# Patient Record
Sex: FEMALE | Race: WHITE | Employment: FULL TIME | URBAN - NONMETROPOLITAN AREA
[De-identification: names, ages, dates, MRNs, and addresses within clinical notes are randomized per-mention and may not be internally consistent; named-entity substitution may affect disease eponyms.]

---

## 2019-02-28 ENCOUNTER — HOSPITAL ENCOUNTER (INPATIENT)
Age: 24
LOS: 3 days | Discharge: HOME | End: 2019-03-03
Attending: OBSTETRICS & GYNECOLOGY | Admitting: OBSTETRICS & GYNECOLOGY
Payer: COMMERCIAL

## 2019-02-28 PROCEDURE — 86901 BLOOD TYPING SEROLOGIC RH(D): CPT

## 2019-02-28 PROCEDURE — 86870 RBC ANTIBODY IDENTIFICATION: CPT

## 2019-02-28 PROCEDURE — 86900 BLOOD TYPING SEROLOGIC ABO: CPT

## 2019-02-28 PROCEDURE — 1220000001 HC SEMI PRIVATE L&D R&B

## 2019-02-28 PROCEDURE — 2709999900 HC NON-CHARGEABLE SUPPLY

## 2019-02-28 PROCEDURE — 86850 RBC ANTIBODY SCREEN: CPT

## 2019-02-28 PROCEDURE — 80307 DRUG TEST PRSMV CHEM ANLYZR: CPT

## 2019-02-28 PROCEDURE — 36415 COLL VENOUS BLD VENIPUNCTURE: CPT

## 2019-02-28 PROCEDURE — 85025 COMPLETE CBC W/AUTO DIFF WBC: CPT

## 2019-02-28 RX ORDER — BUTORPHANOL TARTRATE 1 MG/ML
1 INJECTION, SOLUTION INTRAMUSCULAR; INTRAVENOUS
Status: DISCONTINUED | OUTPATIENT
Start: 2019-02-28 | End: 2019-03-01 | Stop reason: HOSPADM

## 2019-02-28 RX ORDER — ONDANSETRON 2 MG/ML
8 INJECTION INTRAMUSCULAR; INTRAVENOUS EVERY 6 HOURS PRN
Status: DISCONTINUED | OUTPATIENT
Start: 2019-02-28 | End: 2019-03-01 | Stop reason: HOSPADM

## 2019-02-28 RX ORDER — METHYLERGONOVINE MALEATE 0.2 MG/ML
200 INJECTION INTRAVENOUS PRN
Status: DISCONTINUED | OUTPATIENT
Start: 2019-02-28 | End: 2019-03-01 | Stop reason: HOSPADM

## 2019-02-28 RX ORDER — SODIUM CHLORIDE, SODIUM LACTATE, POTASSIUM CHLORIDE, CALCIUM CHLORIDE 600; 310; 30; 20 MG/100ML; MG/100ML; MG/100ML; MG/100ML
INJECTION, SOLUTION INTRAVENOUS CONTINUOUS
Status: DISCONTINUED | OUTPATIENT
Start: 2019-02-28 | End: 2019-03-01

## 2019-02-28 RX ORDER — MORPHINE SULFATE 2 MG/ML
2 INJECTION, SOLUTION INTRAMUSCULAR; INTRAVENOUS
Status: DISCONTINUED | OUTPATIENT
Start: 2019-02-28 | End: 2019-03-01 | Stop reason: HOSPADM

## 2019-02-28 RX ORDER — SODIUM CHLORIDE 0.9 % (FLUSH) 0.9 %
10 SYRINGE (ML) INJECTION EVERY 12 HOURS SCHEDULED
Status: DISCONTINUED | OUTPATIENT
Start: 2019-02-28 | End: 2019-03-01 | Stop reason: HOSPADM

## 2019-02-28 RX ORDER — DIPHENHYDRAMINE HYDROCHLORIDE 50 MG/ML
25 INJECTION INTRAMUSCULAR; INTRAVENOUS EVERY 4 HOURS PRN
Status: DISCONTINUED | OUTPATIENT
Start: 2019-02-28 | End: 2019-03-01 | Stop reason: HOSPADM

## 2019-02-28 RX ORDER — SEVOFLURANE 250 ML/250ML
1 LIQUID RESPIRATORY (INHALATION) CONTINUOUS PRN
Status: DISCONTINUED | OUTPATIENT
Start: 2019-02-28 | End: 2019-03-01 | Stop reason: HOSPADM

## 2019-02-28 RX ORDER — LIDOCAINE HYDROCHLORIDE 10 MG/ML
30 INJECTION, SOLUTION EPIDURAL; INFILTRATION; INTRACAUDAL; PERINEURAL PRN
Status: DISCONTINUED | OUTPATIENT
Start: 2019-02-28 | End: 2019-03-01 | Stop reason: HOSPADM

## 2019-02-28 RX ORDER — MISOPROSTOL 200 UG/1
1000 TABLET ORAL PRN
Status: DISCONTINUED | OUTPATIENT
Start: 2019-02-28 | End: 2019-03-01 | Stop reason: HOSPADM

## 2019-02-28 RX ORDER — IBUPROFEN 800 MG/1
800 TABLET ORAL EVERY 8 HOURS PRN
Status: DISCONTINUED | OUTPATIENT
Start: 2019-02-28 | End: 2019-03-01

## 2019-02-28 RX ORDER — ACETAMINOPHEN 325 MG/1
650 TABLET ORAL EVERY 4 HOURS PRN
Status: DISCONTINUED | OUTPATIENT
Start: 2019-02-28 | End: 2019-03-01 | Stop reason: HOSPADM

## 2019-02-28 RX ORDER — SODIUM CHLORIDE 0.9 % (FLUSH) 0.9 %
10 SYRINGE (ML) INJECTION PRN
Status: DISCONTINUED | OUTPATIENT
Start: 2019-02-28 | End: 2019-03-01 | Stop reason: HOSPADM

## 2019-02-28 RX ORDER — MORPHINE SULFATE 4 MG/ML
4 INJECTION, SOLUTION INTRAMUSCULAR; INTRAVENOUS
Status: DISCONTINUED | OUTPATIENT
Start: 2019-02-28 | End: 2019-03-01 | Stop reason: HOSPADM

## 2019-02-28 RX ORDER — TERBUTALINE SULFATE 1 MG/ML
0.25 INJECTION, SOLUTION SUBCUTANEOUS ONCE
Status: DISCONTINUED | OUTPATIENT
Start: 2019-02-28 | End: 2019-03-01 | Stop reason: HOSPADM

## 2019-03-01 ENCOUNTER — ANESTHESIA EVENT (OUTPATIENT)
Dept: LABOR AND DELIVERY | Age: 24
End: 2019-03-01
Payer: COMMERCIAL

## 2019-03-01 ENCOUNTER — ANESTHESIA (OUTPATIENT)
Dept: LABOR AND DELIVERY | Age: 24
End: 2019-03-01
Payer: COMMERCIAL

## 2019-03-01 PROBLEM — O02.1 FETAL DEMISE BEFORE 20 WEEKS WITH RETENTION OF DEAD FETUS: Status: RESOLVED | Noted: 2019-03-01 | Resolved: 2019-03-01

## 2019-03-01 PROBLEM — O02.1 FETAL DEMISE BEFORE 20 WEEKS WITH RETENTION OF DEAD FETUS: Status: ACTIVE | Noted: 2019-03-01

## 2019-03-01 LAB
AMPHETAMINE+METHAMPHETAMINE URINE SCREEN: NEGATIVE
BARBITURATE QUANTITATIVE URINE: NEGATIVE
BASOPHILS # BLD: 0.3 %
BASOPHILS ABSOLUTE: 0 THOU/MM3 (ref 0–0.1)
BENZODIAZEPINE QUANTITATIVE URINE: NEGATIVE
CANNABINOID QUANTITATIVE URINE: NEGATIVE
COCAINE METABOLITE QUANTITATIVE URINE: NEGATIVE
EOSINOPHIL # BLD: 0.3 %
EOSINOPHILS ABSOLUTE: 0 THOU/MM3 (ref 0–0.4)
ERYTHROCYTE [DISTWIDTH] IN BLOOD BY AUTOMATED COUNT: 14.7 % (ref 11.5–14.5)
ERYTHROCYTE [DISTWIDTH] IN BLOOD BY AUTOMATED COUNT: 49.7 FL (ref 35–45)
HCT VFR BLD CALC: 43.6 % (ref 37–47)
HEMOGLOBIN: 14.4 GM/DL (ref 12–16)
IMMATURE GRANS (ABS): 0.08 THOU/MM3 (ref 0–0.07)
IMMATURE GRANULOCYTES: 0.7 %
LYMPHOCYTES # BLD: 9.7 %
LYMPHOCYTES ABSOLUTE: 1 THOU/MM3 (ref 1–4.8)
MCH RBC QN AUTO: 30.8 PG (ref 26–33)
MCHC RBC AUTO-ENTMCNC: 33 GM/DL (ref 32.2–35.5)
MCV RBC AUTO: 93.4 FL (ref 81–99)
MONOCYTES # BLD: 5 %
MONOCYTES ABSOLUTE: 0.5 THOU/MM3 (ref 0.4–1.3)
NUCLEATED RED BLOOD CELLS: 0 /100 WBC
OPIATES, URINE: NEGATIVE
OXYCODONE: NEGATIVE
PHENCYCLIDINE QUANTITATIVE URINE: NEGATIVE
PLATELET # BLD: 139 THOU/MM3 (ref 130–400)
PMV BLD AUTO: 12.3 FL (ref 9.4–12.4)
RBC # BLD: 4.67 MILL/MM3 (ref 4.2–5.4)
SEG NEUTROPHILS: 84 %
SEGMENTED NEUTROPHILS ABSOLUTE COUNT: 9.1 THOU/MM3 (ref 1.8–7.7)
WBC # BLD: 10.8 THOU/MM3 (ref 4.8–10.8)

## 2019-03-01 PROCEDURE — 0UQMXZZ REPAIR VULVA, EXTERNAL APPROACH: ICD-10-PCS | Performed by: OBSTETRICS & GYNECOLOGY

## 2019-03-01 PROCEDURE — 7200000001 HC VAGINAL DELIVERY

## 2019-03-01 PROCEDURE — 2580000003 HC RX 258: Performed by: OBSTETRICS & GYNECOLOGY

## 2019-03-01 PROCEDURE — 6360000002 HC RX W HCPCS

## 2019-03-01 PROCEDURE — 6370000000 HC RX 637 (ALT 250 FOR IP): Performed by: OBSTETRICS & GYNECOLOGY

## 2019-03-01 PROCEDURE — 3E0R3BZ INTRODUCTION OF ANESTHETIC AGENT INTO SPINAL CANAL, PERCUTANEOUS APPROACH: ICD-10-PCS | Performed by: OBSTETRICS & GYNECOLOGY

## 2019-03-01 PROCEDURE — 88307 TISSUE EXAM BY PATHOLOGIST: CPT

## 2019-03-01 PROCEDURE — 0KQM0ZZ REPAIR PERINEUM MUSCLE, OPEN APPROACH: ICD-10-PCS | Performed by: OBSTETRICS & GYNECOLOGY

## 2019-03-01 PROCEDURE — 3700000025 ANESTHESIA EPIDURAL BLOCK: Performed by: ANESTHESIOLOGY

## 2019-03-01 PROCEDURE — 1220000000 HC SEMI PRIVATE OB R&B

## 2019-03-01 PROCEDURE — 2709999900 HC NON-CHARGEABLE SUPPLY

## 2019-03-01 PROCEDURE — 2500000003 HC RX 250 WO HCPCS: Performed by: ANESTHESIOLOGY

## 2019-03-01 PROCEDURE — 00HU33Z INSERTION OF INFUSION DEVICE INTO SPINAL CANAL, PERCUTANEOUS APPROACH: ICD-10-PCS | Performed by: OBSTETRICS & GYNECOLOGY

## 2019-03-01 PROCEDURE — 6360000002 HC RX W HCPCS: Performed by: OBSTETRICS & GYNECOLOGY

## 2019-03-01 RX ORDER — EPHEDRINE SULFATE 50 MG/ML
5 INJECTION, SOLUTION INTRAVENOUS PRN
Status: DISCONTINUED | OUTPATIENT
Start: 2019-03-01 | End: 2019-03-01

## 2019-03-01 RX ORDER — METHYLERGONOVINE MALEATE 0.2 MG/ML
200 INJECTION INTRAVENOUS
Status: ACTIVE | OUTPATIENT
Start: 2019-03-01 | End: 2019-03-01

## 2019-03-01 RX ORDER — MISOPROSTOL 200 UG/1
800 TABLET ORAL
Status: ACTIVE | OUTPATIENT
Start: 2019-03-01 | End: 2019-03-01

## 2019-03-01 RX ORDER — ONDANSETRON 4 MG/1
8 TABLET, FILM COATED ORAL EVERY 8 HOURS PRN
Status: DISCONTINUED | OUTPATIENT
Start: 2019-03-01 | End: 2019-03-03 | Stop reason: HOSPADM

## 2019-03-01 RX ORDER — HYDROCODONE BITARTRATE AND ACETAMINOPHEN 5; 325 MG/1; MG/1
1 TABLET ORAL EVERY 4 HOURS PRN
Status: DISCONTINUED | OUTPATIENT
Start: 2019-03-01 | End: 2019-03-03 | Stop reason: HOSPADM

## 2019-03-01 RX ORDER — SODIUM CHLORIDE, SODIUM LACTATE, POTASSIUM CHLORIDE, CALCIUM CHLORIDE 600; 310; 30; 20 MG/100ML; MG/100ML; MG/100ML; MG/100ML
INJECTION, SOLUTION INTRAVENOUS CONTINUOUS
Status: DISCONTINUED | OUTPATIENT
Start: 2019-03-01 | End: 2019-03-03 | Stop reason: HOSPADM

## 2019-03-01 RX ORDER — HYDROCODONE BITARTRATE AND ACETAMINOPHEN 5; 325 MG/1; MG/1
2 TABLET ORAL EVERY 4 HOURS PRN
Status: DISCONTINUED | OUTPATIENT
Start: 2019-03-01 | End: 2019-03-03 | Stop reason: HOSPADM

## 2019-03-01 RX ORDER — MORPHINE SULFATE 4 MG/ML
2 INJECTION, SOLUTION INTRAMUSCULAR; INTRAVENOUS
Status: DISCONTINUED | OUTPATIENT
Start: 2019-03-01 | End: 2019-03-03 | Stop reason: HOSPADM

## 2019-03-01 RX ORDER — DOCUSATE SODIUM 100 MG/1
100 CAPSULE, LIQUID FILLED ORAL 2 TIMES DAILY PRN
Status: DISCONTINUED | OUTPATIENT
Start: 2019-03-01 | End: 2019-03-03 | Stop reason: HOSPADM

## 2019-03-01 RX ORDER — FERROUS SULFATE 325(65) MG
325 TABLET ORAL
Status: DISCONTINUED | OUTPATIENT
Start: 2019-03-02 | End: 2019-03-03 | Stop reason: HOSPADM

## 2019-03-01 RX ORDER — LANOLIN 100 %
OINTMENT (GRAM) TOPICAL PRN
Status: DISCONTINUED | OUTPATIENT
Start: 2019-03-01 | End: 2019-03-03 | Stop reason: HOSPADM

## 2019-03-01 RX ORDER — SODIUM CHLORIDE 0.9 % (FLUSH) 0.9 %
10 SYRINGE (ML) INJECTION EVERY 12 HOURS SCHEDULED
Status: DISCONTINUED | OUTPATIENT
Start: 2019-03-01 | End: 2019-03-03 | Stop reason: HOSPADM

## 2019-03-01 RX ORDER — ONDANSETRON 2 MG/ML
4 INJECTION INTRAMUSCULAR; INTRAVENOUS EVERY 6 HOURS PRN
Status: DISCONTINUED | OUTPATIENT
Start: 2019-03-01 | End: 2019-03-01 | Stop reason: HOSPADM

## 2019-03-01 RX ORDER — MORPHINE SULFATE 4 MG/ML
4 INJECTION, SOLUTION INTRAMUSCULAR; INTRAVENOUS
Status: DISCONTINUED | OUTPATIENT
Start: 2019-03-01 | End: 2019-03-03 | Stop reason: HOSPADM

## 2019-03-01 RX ORDER — NALOXONE HYDROCHLORIDE 0.4 MG/ML
0.4 INJECTION, SOLUTION INTRAMUSCULAR; INTRAVENOUS; SUBCUTANEOUS PRN
Status: DISCONTINUED | OUTPATIENT
Start: 2019-03-01 | End: 2019-03-01 | Stop reason: HOSPADM

## 2019-03-01 RX ORDER — NALBUPHINE HCL 10 MG/ML
5 AMPUL (ML) INJECTION EVERY 4 HOURS PRN
Status: DISCONTINUED | OUTPATIENT
Start: 2019-03-01 | End: 2019-03-01 | Stop reason: HOSPADM

## 2019-03-01 RX ORDER — ONDANSETRON 2 MG/ML
4 INJECTION INTRAMUSCULAR; INTRAVENOUS EVERY 6 HOURS PRN
Status: DISCONTINUED | OUTPATIENT
Start: 2019-03-01 | End: 2019-03-03 | Stop reason: HOSPADM

## 2019-03-01 RX ORDER — ACETAMINOPHEN 325 MG/1
650 TABLET ORAL EVERY 4 HOURS PRN
Status: DISCONTINUED | OUTPATIENT
Start: 2019-03-01 | End: 2019-03-03 | Stop reason: HOSPADM

## 2019-03-01 RX ORDER — SODIUM CHLORIDE 0.9 % (FLUSH) 0.9 %
10 SYRINGE (ML) INJECTION PRN
Status: DISCONTINUED | OUTPATIENT
Start: 2019-03-01 | End: 2019-03-03 | Stop reason: HOSPADM

## 2019-03-01 RX ORDER — IBUPROFEN 800 MG/1
800 TABLET ORAL 3 TIMES DAILY
Status: DISCONTINUED | OUTPATIENT
Start: 2019-03-01 | End: 2019-03-03 | Stop reason: HOSPADM

## 2019-03-01 RX ORDER — CARBOPROST TROMETHAMINE 250 UG/ML
250 INJECTION, SOLUTION INTRAMUSCULAR PRN
Status: DISCONTINUED | OUTPATIENT
Start: 2019-03-01 | End: 2019-03-03 | Stop reason: HOSPADM

## 2019-03-01 RX ORDER — CARBOPROST TROMETHAMINE 250 UG/ML
250 INJECTION, SOLUTION INTRAMUSCULAR
Status: DISPENSED | OUTPATIENT
Start: 2019-03-01 | End: 2019-03-01

## 2019-03-01 RX ORDER — LEVOTHYROXINE SODIUM 88 UG/1
88 TABLET ORAL DAILY
Status: DISCONTINUED | OUTPATIENT
Start: 2019-03-01 | End: 2019-03-03 | Stop reason: HOSPADM

## 2019-03-01 RX ADMIN — IBUPROFEN 800 MG: 800 TABLET, FILM COATED ORAL at 13:28

## 2019-03-01 RX ADMIN — SODIUM CHLORIDE, POTASSIUM CHLORIDE, SODIUM LACTATE AND CALCIUM CHLORIDE: 600; 310; 30; 20 INJECTION, SOLUTION INTRAVENOUS at 01:14

## 2019-03-01 RX ADMIN — SODIUM CHLORIDE, POTASSIUM CHLORIDE, SODIUM LACTATE AND CALCIUM CHLORIDE: 600; 310; 30; 20 INJECTION, SOLUTION INTRAVENOUS at 00:25

## 2019-03-01 RX ADMIN — SODIUM CHLORIDE, POTASSIUM CHLORIDE, SODIUM LACTATE AND CALCIUM CHLORIDE: 600; 310; 30; 20 INJECTION, SOLUTION INTRAVENOUS at 05:31

## 2019-03-01 RX ADMIN — LEVOTHYROXINE SODIUM 88 MCG: 88 TABLET ORAL at 15:26

## 2019-03-01 RX ADMIN — BUTORPHANOL TARTRATE 1 MG: 1 INJECTION, SOLUTION INTRAMUSCULAR; INTRAVENOUS at 00:51

## 2019-03-01 RX ADMIN — Medication 16 ML/HR: at 01:42

## 2019-03-01 RX ADMIN — ONDANSETRON 8 MG: 2 INJECTION INTRAMUSCULAR; INTRAVENOUS at 09:33

## 2019-03-01 RX ADMIN — Medication 1 MILLI-UNITS/MIN: at 07:27

## 2019-03-01 ASSESSMENT — PAIN SCALES - GENERAL
PAINLEVEL_OUTOF10: 2
PAINLEVEL_OUTOF10: 10

## 2019-03-01 ASSESSMENT — PAIN DESCRIPTION - DESCRIPTORS
DESCRIPTORS: CRAMPING
DESCRIPTORS: CRAMPING

## 2019-03-01 NOTE — PLAN OF CARE
Problem: Pain:  Goal: Pain level will decrease  Pain level will decrease   Outcome: Ongoing  Pt plans on epidural for pain relief    Problem: Anxiety:  Goal: Level of anxiety will decrease  Level of anxiety will decrease  Outcome: Ongoing  Pt remains calm about the birthing experience,  at bedside, supportive. All questions/concerns addressed by RN. Problem: Breathing Pattern - Ineffective:  Goal: Able to breathe comfortably  Able to breathe comfortably  Outcome: Ongoing  No signs of resp distress noted. Sp02 remains greater than 92% on room air. Respirations equal and unlabored. Problem: Fluid Volume - Imbalance:  Goal: Absence of intrapartum hemorrhage signs and symptoms  Absence of intrapartum hemorrhage signs and symptoms  Outcome: Ongoing  No vaginal bleeding noted, will continue to monitor. Problem: Infection - Intrapartum Infection:  Goal: Will show no infection signs and symptoms  Will show no infection signs and symptoms  Outcome: Ongoing  Vitals stable, pt remains afebrile. FHT's remain reassuring, will continue to monitor. Problem: Labor Process - Prolonged:  Goal: Uterine contractions within specified parameters  Uterine contractions within specified parameters  Outcome: Ongoing  Contractions regular    Problem:  Screening:  Goal: Ability to make informed decisions regarding treatment has improved  Ability to make informed decisions regarding treatment has improved  Outcome: Ongoing  Able to make informed decisions. Oriented x4    Problem: Pain - Acute:  Goal: Pain level will decrease  Pain level will decrease   Outcome: Ongoing  Pt plans on epidural for pain relief    Problem: Tissue Perfusion - Uteroplacental, Altered:  Goal: Absence of abnormal fetal heart rate pattern  Absence of abnormal fetal heart rate pattern  Outcome: Ongoing  . fht's regular and strong with accels noted.  Will continue to monitor    Problem: Urinary Retention:  Goal: Urinary elimination within specified parameters  Urinary elimination within specified parameters  Outcome: Ongoing  Pt voiding sufficiently; will continue to montior    Problem: Falls - Risk of:  Goal: Will remain free from falls  Will remain free from falls  Outcome: Ongoing  Pt to remain from injuries/fall with IV in place, walkway will remain free of clutter. Comments: Care plan reviewed with patient and family. Patient and family verbalize understanding of the plan of care and contribute to goal setting.

## 2019-03-01 NOTE — FLOWSHEET NOTE
Pt instructed to ambulate in halls for an hour per Dr Steve Dela Cruz orders to see if makes any change in ve.  Monitors off

## 2019-03-01 NOTE — FLOWSHEET NOTE
Dr Elvira Coleman phoned unit. Updated on pt's status. Pt comfortable with her epidural. EFM strip remains reactive. Pitocin increased as needed. No new vag exam since her last phone call. Will update as needed.

## 2019-03-01 NOTE — FLOWSHEET NOTE
Dr Eric Velazquez updated that pt pushed well with trial push. Will take out carbajal and begin pushing. Dr Eric Velazquez heading this way.

## 2019-03-01 NOTE — FLOWSHEET NOTE
Pt arrives for c/o ctx that started over a hour ago. Denies any rom,  and states has good fetal movement. Denies any bleeding. Oriented to room and gown given to pt to change into. Patient to bathroom to void, informed of maternal drug testing policy in place on all laboring patients. Consent signed and urine sent.

## 2019-03-01 NOTE — FLOWSHEET NOTE
Dr Eric Velazquez on phone and notified of pt here and status, fht's with accels, ctx pattern, ve.   Orders received

## 2019-03-01 NOTE — PLAN OF CARE
Problem: Pain:  Goal: Pain level will decrease  Pain level will decrease    Outcome: Ongoing  Pt comfortable with her epidural. Decided on a pain goal of 4/10 while she's here. Goal: Control of acute pain  Control of acute pain   Outcome: Ongoing  Pt remains comfortable with her epidural.   Goal: Control of chronic pain  Control of chronic pain   Outcome: Completed Date Met: 19      Problem: Anxiety:  Goal: Level of anxiety will decrease  Level of anxiety will decrease   Outcome: Ongoing  Pt remains calm about the birthing experience, Anthonie and family at bedside, supportive. All questions/concerns addressed by RN. Problem: Breathing Pattern - Ineffective:  Goal: Able to breathe comfortably  Able to breathe comfortably   Outcome: Ongoing  No signs of resp distress noted. Sp02 remains greater than 92% on room air. Respirations equal and unlabored. Problem: Fluid Volume - Imbalance:  Goal: Absence of intrapartum hemorrhage signs and symptoms  Absence of intrapartum hemorrhage signs and symptoms   Outcome: Ongoing  No vaginal bleeding noted, will continue to monitor. Problem: Infection - Intrapartum Infection:  Goal: Will show no infection signs and symptoms  Will show no infection signs and symptoms   Outcome: Ongoing  Vitals stable, pt remains afebrile. GBS negative. FHT's remain reassuring, will continue to monitor. Problem: Labor Process - Prolonged:  Goal: Uterine contractions within specified parameters  Uterine contractions within specified parameters   Outcome: Ongoing  IUPC in place. Pt maggy regularly and pitocin infusing as needed. Will continue to monitor.     Problem:  Screening:  Goal: Ability to make informed decisions regarding treatment has improved  Ability to make informed decisions regarding treatment has improved   Outcome: Completed Date Met: 19      Problem: Pain - Acute:  Goal: Pain level will decrease  Pain level will decrease    Outcome: Ongoing  Pt comfortable with her epidural. Decided on a pain goal of 4/10 while she's here. Problem: Tissue Perfusion - Uteroplacental, Altered:  Goal: Absence of abnormal fetal heart rate pattern  Absence of abnormal fetal heart rate pattern   Outcome: Ongoing  Fetal Heart Tones remain reassuring. Continuous EFM in place. Problem: Urinary Retention:  Goal: Urinary elimination within specified parameters  Urinary elimination within specified parameters   Outcome: Ongoing  Garcia catheter in place, draining clear, yellow urine. Problem: Falls - Risk of:  Goal: Will remain free from falls  Will remain free from falls   Outcome: Ongoing  Pt. Bedrest with her epidural. Remains free from falls at this time. IV infusing per order. Side rails up X2. Call light within reach. S.O. At bedside. RN will continue to provide for a safe environment. Comments: Care plan reviewed with patient and Anthonie. Patient and Anthonie verbalize understanding of the plan of care and contribute to goal setting.

## 2019-03-01 NOTE — FLOWSHEET NOTE
Admitted to 5B 27 in wheelchair from L&D, oriented to room and unit, due to void x2 with assist. Will call for help when up, voices understanding.

## 2019-03-01 NOTE — PLAN OF CARE
Problem: DISCHARGE BARRIERS  Goal: Patient's continuum of care needs are met  Outcome: Ongoing  15 week loss, support offered. See SW note.

## 2019-03-01 NOTE — FLOWSHEET NOTE
Pt taken to [facility] via wheelchair in stable condition. Report given to Camila Blancas RN. Pt d/t void x2.

## 2019-03-01 NOTE — H&P
Stephanie Milton is a 24 y.o. female patient. No diagnosis found. Past Medical History:   Diagnosis Date    Thyroid disease     hypothyroid     OB History      Para Term  AB Living    1              SAB TAB Ectopic Molar Multiple Live Births                       39w2d  Estimated Date of Delivery: 3/6/19  No Known Allergies  Active Problems:    * No active hospital problems. *  Resolved Problems:    * No resolved hospital problems. *    Blood pressure 107/76, pulse 96, temperature 97.9 °F (36.6 °C), temperature source Oral, resp. rate 18, height 5' 6\" (1.676 m), weight 147 lb (66.7 kg), SpO2 91 %, not currently breastfeeding. Maternal Medical History:   Reason for admission: Rupture of membranes and contractions. Contractions: Onset was 6-12 hours ago. Frequency: regular. Perceived severity is mild. Fetal activity: Perceived fetal activity is normal.    Last perceived fetal movement was within the past hour. Prenatal complications: hypothyroid    Prenatal Complications - Diabetes: none. Maternal Exam:   Uterine Assessment: Contraction strength is mild. Contraction frequency is regular. Abdomen: Patient reports no abdominal tenderness. Fetal presentation: vertex    Introitus: Normal vulva. Normal vagina. Amniotic fluid character: clear. Pelvis: adequate for delivery. Cervix: Cervix evaluated by digital exam.    5-6cm per nursing    Fetal Exam  Fetal Monitor Review: Mode: ultrasound. Baseline rate: 120. Variability: moderate (6-25 bpm). Pattern: accelerations present. Fetal State Assessment: Category I - tracings are normal.          Assessment:  Active phase labor. Membrane status: SROM.    Fetal well-being: normal.   39 week gestation  Hypothyroid    Plan:  Admit to L&D  Epidural in place  Pitocin if needed  DO Dave  3/1/2019

## 2019-03-01 NOTE — FLOWSHEET NOTE
Dr Darin Solis updated on pt's status. Pt anterior lip and +1 station. Will try a trial push with pt and if she does well, doc will head this way for delivery.

## 2019-03-01 NOTE — FLOWSHEET NOTE
Darlin care done. Clean pads and gown provided. Dermoplast and tucks pads reapplied but ice pack removed for now.

## 2019-03-01 NOTE — CARE COORDINATION
3/1/19, 2:53 PM    DISCHARGE BARRIERS    Nurse reports 15 week loss after falling down at home, came to the ED and discovered she was pregnant. Nurse states mother has had miscarriage and SIDS at 9 weeks of age. SW met with mother to provide support and intervention. Long conversation with mother, planning [facility] burial, good support at home and plans to follow up with Ernestina Ramos for counseling. SW will contact mother this spring prior to burial. FLORENCIO updated nurse, China Watson.

## 2019-03-01 NOTE — L&D DELIVERY NOTE
Department of Obstetrics and Gynecology  Spontaneous Vaginal Delivery Note      Pt Name: Estrella Thao  MRN: [id number] [place] #: [de-identified]  YOB: 1995  Procedure Performed By: Yaw Lugo D.O.        Pre-operative Diagnosis:  Term pregnancy, Spontaneous labor, Single fetus and Pregnancy complicated by: hypothyroid    Post-operative Diagnosis: Same, delivered, tight nuchal cord. Procedure:  Vacuum assisted vaginal delivery    Surgeon:  Jabier Villalobos D.O. Information for the patient's :  Soni Soares [[id number]]    6lb 2oz      Anesthesia:  epidural anesthesia    Estimated blood loss:  300ml    Specimen:  Placenta sent to pathology     Complications:  maternal temperature, tight nuchal cord, low APGARs    Condition:  infant stable to special care nursery and mother stable    Details of Procedure: The patient is a 24 y.o. female at 44w2d   OB History      Para Term  AB Living    1 1 1     1    SAB TAB Ectopic Molar Multiple Live Births            0 1       who was admitted for active phase labor. She received the following interventions: IV Pitocin augmentation. The patient progressed well,did receive an epidural, became complete and started to push. She was placed in the dorsal lithotomy position and prepped. Fetal heart tones started to progress from early decelerations with pushing to late decelerations. Fetal head was in the +3 position. Patient gave verbal consent to use the vacuum to assist in speeding up the delivery. Vacuum was placed on the fetal vertex and proper placement verified. With the next contraction she delivered the fetal head with vacuum assistance on the second push with only one pull and no pop offs of the vacuum. Once the head was delivered, a tight nuchal cord was noted and clamped and cut at the perineum, the rest of the infant delivered easily, and was placed on the maternal abdomen for recovery.  The nose and mouth were Number of pulls:  1    Vacuum applied by:  DR Garcia Basket    Failed?:  No      Shoulder Dystocia    Shoulder dystocia present?:  No  Add Second Maneuver  Add Third Maneuver  Add Fourth Maneuver  Add Fifth Maneuver  Add Sixth Maneuver  Add Seventh Maneuver  Add Eighth Maneuver  Add Ninth Maneuver      Presentation    Presentation:  Vertex  _:  Occiput  _:  Anterior     [place] Information     Changing the 's delivery date/time could affect patient care.:     Delivery date/time:   3/1/19 1300   Delivery type:  Vaginal, Vacuum (Extractor)    Details:         Delivery Providers    Delivering clinician:  Mya Umana,    Provider Role    Darinel Barrera RN Delivery Nurse    Amarilis Daigle, RN Registered Nurse    Ghulam Verma RCP Respiratory Therapist (Day)    Phuc Guevara, APRN - CNP Advanced Practice Nurse    Nydia Dye MD Physician      Placenta    Date/time:  3/1/2019 1303  Removal:  Spontaneous  Appearance:  Intact  Disposition:  Pathology, Lab     Apgars    Living status:  Living  Apgars   1 Minute:   5 Minute:   10 Minute 15 Minute 20 Minute   Skin Color: 0  0  1      Heart Rate: 1  2  2      Reflex Irritability: 0  1  2      Muscle Tone: 0  1  1      Respiratory Effort: 0  1  2      Total: 1  5  8              Apgars Assigned By:  DERRICK MARTINEZ & [facility] RN     [place] Measurements    Weight:  2800 g    ID band #:  [id number] Security tag #:  [id number]      Delivery Information    Episiotomy:  None  Perineal lacerations:  2nd Repaired:  Yes   Labial laceration:  right Repaired:  Yes   Vaginal laceration:  No    Cervical laceration:  No    Surgical or additional est. blood loss (mL):  0 (View Only):  Edit in Flowsheets   Combined est. blood loss (mL):  0     Vaginal Delivery Counts    Initial count personnel:  ASHLEIGH CH RNC  Initial count verified by:  16 INSTRUMENTS,1 NEEDLE,10 SPONGES   4x4:   Needles:   Instruments:   Lap Pads:   Sponges:     Initial counts:          Final

## 2019-03-01 NOTE — LACTATION NOTE
Infant was taken to SCN. Lactation went to set up pump for pt. Pt. Stated she would like to use her own Medela breast pump and it would be arriving ant the hospital soon. Discussed with pt. Differences in hospital and home pumps. Pt. Stated she would like to use her own pump. Encouraged pt. To call out for assistance with her pump when it arrived. Will follow up wit pt. PRN.

## 2019-03-02 LAB
ABO CHECK: NORMAL
ABO: NORMAL
ANTIBODY IDENTIFICATION: NORMAL
ANTIBODY SCREEN: NORMAL
BASOPHILS # BLD: 0.1 %
BASOPHILS ABSOLUTE: 0 THOU/MM3 (ref 0–0.1)
EOSINOPHIL # BLD: 1.1 %
EOSINOPHILS ABSOLUTE: 0.1 THOU/MM3 (ref 0–0.4)
ERYTHROCYTE [DISTWIDTH] IN BLOOD BY AUTOMATED COUNT: 14.6 % (ref 11.5–14.5)
ERYTHROCYTE [DISTWIDTH] IN BLOOD BY AUTOMATED COUNT: 49.1 FL (ref 35–45)
FETAL SCREEN: NORMAL
GESTATIONAL AGE(WEEKS): NORMAL
HCT VFR BLD CALC: 28.1 % (ref 37–47)
HEMOGLOBIN: 9.4 GM/DL (ref 12–16)
IMMATURE GRANS (ABS): 0.07 THOU/MM3 (ref 0–0.07)
IMMATURE GRANULOCYTES: 0.7 %
LYMPHOCYTES # BLD: 11.6 %
LYMPHOCYTES ABSOLUTE: 1.1 THOU/MM3 (ref 1–4.8)
MCH RBC QN AUTO: 30.8 PG (ref 26–33)
MCHC RBC AUTO-ENTMCNC: 33.5 GM/DL (ref 32.2–35.5)
MCV RBC AUTO: 92.1 FL (ref 81–99)
MONOCYTES # BLD: 6.5 %
MONOCYTES ABSOLUTE: 0.6 THOU/MM3 (ref 0.4–1.3)
NUCLEATED RED BLOOD CELLS: 0 /100 WBC
PLATELET # BLD: 111 THOU/MM3 (ref 130–400)
PMV BLD AUTO: 12.7 FL (ref 9.4–12.4)
RBC # BLD: 3.05 MILL/MM3 (ref 4.2–5.4)
RH FACTOR: NORMAL
RH FACTOR: NORMAL
SEG NEUTROPHILS: 80 %
SEGMENTED NEUTROPHILS ABSOLUTE COUNT: 7.9 THOU/MM3 (ref 1.8–7.7)
SELECTED GEL ANTIBODY SCREEN: NORMAL
WBC # BLD: 9.9 THOU/MM3 (ref 4.8–10.8)

## 2019-03-02 PROCEDURE — 85461 HEMOGLOBIN FETAL: CPT

## 2019-03-02 PROCEDURE — 2709999900 HC NON-CHARGEABLE SUPPLY

## 2019-03-02 PROCEDURE — 1220000000 HC SEMI PRIVATE OB R&B

## 2019-03-02 PROCEDURE — 86901 BLOOD TYPING SEROLOGIC RH(D): CPT

## 2019-03-02 PROCEDURE — 85025 COMPLETE CBC W/AUTO DIFF WBC: CPT

## 2019-03-02 PROCEDURE — 86885 COOMBS TEST INDIRECT QUAL: CPT

## 2019-03-02 PROCEDURE — 36415 COLL VENOUS BLD VENIPUNCTURE: CPT

## 2019-03-02 PROCEDURE — 86900 BLOOD TYPING SEROLOGIC ABO: CPT

## 2019-03-02 PROCEDURE — 6370000000 HC RX 637 (ALT 250 FOR IP): Performed by: OBSTETRICS & GYNECOLOGY

## 2019-03-02 PROCEDURE — 6360000002 HC RX W HCPCS: Performed by: OBSTETRICS & GYNECOLOGY

## 2019-03-02 RX ADMIN — HUMAN RHO(D) IMMUNE GLOBULIN 300 MCG: 300 INJECTION, SOLUTION INTRAMUSCULAR at 18:36

## 2019-03-02 RX ADMIN — LEVOTHYROXINE SODIUM 88 MCG: 88 TABLET ORAL at 10:22

## 2019-03-02 RX ADMIN — IBUPROFEN 800 MG: 800 TABLET, FILM COATED ORAL at 03:09

## 2019-03-02 NOTE — PLAN OF CARE
Care plan reviewed with patient. Patient verbalizes understanding of the plan of care and contributes to goal setting.

## 2019-03-02 NOTE — LACTATION NOTE
Set up pts. Medela pump. Educated pt. On pumping and cleaning pump. Will follow up with pt. PRN. Pt. Is pumping for infant in SCN. Will continue to follow up with pt. PRN.

## 2019-03-02 NOTE — DISCHARGE SUMMARY
Obstetric Discharge Summary      Pt Name: Marian Bernard  MRN: [id number] [place] #: [de-identified]  YOB: 1995        Admitting Diagnosis  IUP  OB History        1    Para   1    Term   1            AB        Living   1       SAB        TAB        Ectopic        Molar        Multiple   0    Live Births   1                Reasons for Admission on 2019  9:15 PM  No comment available  Vaginal Delivery      Intrapartum Procedures        Multiple birth?: no      VAVD           Postpartum/Operative Complications       [place] Data  Information for the patient's :  Claudell Pod [[id number]]   male  Birth Weight: 6 lb 2.8 oz (2.8 kg)      Discharge Diagnosis   s/p VAVD    Discharge Information  Current Discharge Medication List      CONTINUE these medications which have NOT CHANGED    Details   Prenatal MV-Min-Fe Fum-FA-DHA (PRENATAL 1 PO) Take 1 tablet by mouth      levothyroxine (SYNTHROID) 100 MCG tablet Take 88 mcg by mouth Daily              No discharge procedures on file. Vaginal Delivery  Diet regular  Discharge home 3/3/19  Discharge to:  Home  Disposition Stable  Follow up in 4 weeks  Phani Corbett.

## 2019-03-02 NOTE — PLAN OF CARE
Care plan reviewed with patient. Patient verbalizes understanding of the plan of care and contribute to goal setting.

## 2019-03-02 NOTE — PROGRESS NOTES
Department of Obstetrics and Gynecology  Labor and Delivery  Post Partum Day #1      SUBJECTIVE:  Patient feeling well with no complaints. Breastfeeding without difficulty - infant in SCN. Mild lochia. Patient denies pain. Urination without difficulty. OBJECTIVE:/62   Pulse 81   Temp 98.4 °F (36.9 °C) (Oral)   Resp 18   Ht 5' 6\" (1.676 m)   Wt 147 lb (66.7 kg)   SpO2 99%   Breastfeeding? Unknown   BMI 23.73 kg/m²    ABDOMEN:  Soft, non-tender, fundus firm. Extremities: warm and dry. No edema    DATA:    Lab Results   Component Value Date    HGB 9.4 03/02/2019    HCT 28.1 03/02/2019       ASSESSMENT & PLAN:    PPD #1 s/p VAVD.         Routine PP care       D/C home tomorrow       F/U 4 weeks       Jerilyn Boyle

## 2019-03-03 VITALS
RESPIRATION RATE: 16 BRPM | HEART RATE: 64 BPM | DIASTOLIC BLOOD PRESSURE: 78 MMHG | SYSTOLIC BLOOD PRESSURE: 116 MMHG | BODY MASS INDEX: 23.63 KG/M2 | TEMPERATURE: 98.5 F | HEIGHT: 66 IN | OXYGEN SATURATION: 99 % | WEIGHT: 147 LBS

## 2019-03-03 PROCEDURE — 6370000000 HC RX 637 (ALT 250 FOR IP): Performed by: OBSTETRICS & GYNECOLOGY

## 2019-03-03 RX ADMIN — IBUPROFEN 800 MG: 800 TABLET, FILM COATED ORAL at 00:49

## 2019-03-03 RX ADMIN — IBUPROFEN 800 MG: 800 TABLET, FILM COATED ORAL at 10:43

## 2019-03-03 RX ADMIN — DOCUSATE SODIUM 100 MG: 100 CAPSULE, LIQUID FILLED ORAL at 10:44

## 2019-03-03 RX ADMIN — LEVOTHYROXINE SODIUM 88 MCG: 88 TABLET ORAL at 13:43

## 2019-03-03 ASSESSMENT — PAIN SCALES - GENERAL: PAINLEVEL_OUTOF10: 4

## 2019-03-03 NOTE — PLAN OF CARE
Problem: Fluid Volume - Imbalance:  Goal: Absence of postpartum hemorrhage signs and symptoms  Description  Absence of postpartum hemorrhage signs and symptoms   3/3/2019 0939 by Rosie Weber RN  Outcome: Ongoing  Note:   Small amt vaginal bleeding     Problem: Pain - Acute:  Goal: Pain level will decrease  Description  Pain level will decrease   3/3/2019 0939 by Rosie Weber RN  Outcome: Ongoing  Note:   Pain is minimal     Problem: Discharge Planning:  Goal: Discharged to appropriate level of care  Description  Discharged to appropriate level of care   3/3/2019 0939 by Kae Vásquez RN  Outcome: Ongoing  Note:   Discharge is planned for today     Problem: Constipation:  Goal: Bowel elimination is within specified parameters  Description  Bowel elimination is within specified parameters   3/3/2019 0939 by Rosie Weber RN  Outcome: Ongoing  Note:   Pt is taking stool softeners     Problem: Infection - Risk of, Puerperal Infection:  Goal: Will show no infection signs and symptoms  Description  Will show no infection signs and symptoms   3/3/2019 0939 by Rosie Weber RN  Outcome: Ongoing  Note:   Vital signs WNL, no sign of infection     Problem: Mood - Altered:  Goal: Mood stable  Description  Mood stable   3/3/2019 0939 by Kae Vásquez RN  Outcome: Ongoing  Note:   Pt is cheerful and appropriate

## 2019-03-03 NOTE — FLOWSHEET NOTE
Mother's blood type is A NEG. Baby's blood type is A+. Mother DID receive Rhogam on 3/2/2019  Postpartum education brochure given, teaching complete. [place] postpartum depression screening discussed with patient, instructed to contact her healthcare provider if her score is > 10Postpartum education brochure given, teaching complete. [place] postpartum depression screening discussed with patient. Patient instructed to complete [place] postpartum depression screening in 2 weeks and contact her healthcare provider if her score is > 10. Patient voiced understanding. Reviewed postpartum birth warning signs flyer with patient. Patient has voiced understanding of teaching.

## 2019-03-03 NOTE — PLAN OF CARE
Problem: Fluid Volume - Imbalance:  Goal: Absence of postpartum hemorrhage signs and symptoms  Description  Absence of postpartum hemorrhage signs and symptoms   Outcome: Ongoing  Note:   Small amount of lochia     Problem: Pain - Acute:  Goal: Pain level will decrease  Description  Pain level will decrease   Outcome: Ongoing  Note:   Pain controlled with oral pain medication     Problem: Discharge Planning:  Goal: Discharged to appropriate level of care  Description  Discharged to appropriate level of care   Outcome: Ongoing  Note:   Plan is for discharge home today     Problem: Constipation:  Goal: Bowel elimination is within specified parameters  Description  Bowel elimination is within specified parameters   Outcome: Ongoing  Note:   Passing gas, refused stool softener     Problem: Infection - Risk of, Puerperal Infection:  Goal: Will show no infection signs and symptoms  Description  Will show no infection signs and symptoms   Outcome: Ongoing  Note:   No signs of infection     Problem: Mood - Altered:  Goal: Mood stable  Description  Mood stable   Outcome: Ongoing  Note:   Pleasant and cooperative

## 2020-03-09 NOTE — PLAN OF CARE
Care plan reviewed with patient. Patient verbalizes understanding of the plan of care and contributes to goal setting. adequate pain mgmt, ambulate w/o difficulty, normal bleeding

## 2022-11-10 ENCOUNTER — OFFICE VISIT (OUTPATIENT)
Dept: FAMILY MEDICINE CLINIC | Age: 27
End: 2022-11-10
Payer: COMMERCIAL

## 2022-11-10 VITALS
HEART RATE: 72 BPM | RESPIRATION RATE: 12 BRPM | SYSTOLIC BLOOD PRESSURE: 106 MMHG | BODY MASS INDEX: 19.06 KG/M2 | HEIGHT: 66 IN | WEIGHT: 118.6 LBS | DIASTOLIC BLOOD PRESSURE: 60 MMHG

## 2022-11-10 DIAGNOSIS — E06.3 HYPOTHYROIDISM DUE TO HASHIMOTO'S THYROIDITIS: ICD-10-CM

## 2022-11-10 DIAGNOSIS — E03.8 HYPOTHYROIDISM DUE TO HASHIMOTO'S THYROIDITIS: ICD-10-CM

## 2022-11-10 DIAGNOSIS — Z00.00 WELL ADULT EXAM: Primary | ICD-10-CM

## 2022-11-10 PROCEDURE — 99385 PREV VISIT NEW AGE 18-39: CPT | Performed by: NURSE PRACTITIONER

## 2022-11-10 SDOH — ECONOMIC STABILITY: FOOD INSECURITY: WITHIN THE PAST 12 MONTHS, YOU WORRIED THAT YOUR FOOD WOULD RUN OUT BEFORE YOU GOT MONEY TO BUY MORE.: NEVER TRUE

## 2022-11-10 SDOH — ECONOMIC STABILITY: FOOD INSECURITY: WITHIN THE PAST 12 MONTHS, THE FOOD YOU BOUGHT JUST DIDN'T LAST AND YOU DIDN'T HAVE MONEY TO GET MORE.: NEVER TRUE

## 2022-11-10 ASSESSMENT — PATIENT HEALTH QUESTIONNAIRE - PHQ9
SUM OF ALL RESPONSES TO PHQ9 QUESTIONS 1 & 2: 0
1. LITTLE INTEREST OR PLEASURE IN DOING THINGS: 0
SUM OF ALL RESPONSES TO PHQ QUESTIONS 1-9: 0
2. FEELING DOWN, DEPRESSED OR HOPELESS: 0
SUM OF ALL RESPONSES TO PHQ QUESTIONS 1-9: 0

## 2022-11-10 ASSESSMENT — SOCIAL DETERMINANTS OF HEALTH (SDOH): HOW HARD IS IT FOR YOU TO PAY FOR THE VERY BASICS LIKE FOOD, HOUSING, MEDICAL CARE, AND HEATING?: NOT HARD AT ALL

## 2022-11-10 ASSESSMENT — ENCOUNTER SYMPTOMS
SHORTNESS OF BREATH: 0
COUGH: 0
NAUSEA: 0
ABDOMINAL PAIN: 0

## 2022-11-10 NOTE — PROGRESS NOTES
Subjective:      Patient ID: Vijay June 1995 is a 28 y.o. female here for evaluation. NP to establish care. Former PCP was Dr. Kellie Pond    Past Medical History:   Diagnosis Date    Thyroid disease     hypothyroid       Past Surgical History:   Procedure Laterality Date    FRACTURE SURGERY      left arm with plate and screws    WISDOM TOOTH EXTRACTION         Family History   Problem Relation Age of Onset    Heart Disease Father     Heart Attack Father     Arthritis Neg Hx     Asthma Neg Hx     Atrial Fibrillation Neg Hx     Birth Defects Neg Hx        Current Outpatient Medications   Medication Sig Dispense Refill    levothyroxine (SYNTHROID) 100 MCG tablet Take 100 mcg by mouth Daily       No current facility-administered medications for this visit. Social:     Chief Complaint   Patient presents with    Establish Care     Previous patient of Dr. Kellie Pond    Hypothyroidism       Hashimoto. Fatigue. Puffiness in face and eyes. Has been off medication for 6+ months . Last check 1 year ago. Last dose was 100 mcg but felt like it was too much. Heavy periods. Regular. Vitals:    11/10/22 1105   BP: 106/60   Pulse: 72   Resp: 12        Denies CP, SOB or chest tightness. Active. Regular exercise. BP Readings from Last 3 Encounters:   11/10/22 106/60   03/03/19 116/78   06/10/16 106/65       Due for screening labs.     No results found for: LABA1C  No results found for: EAG    No components found for: CHLPL  No results found for: TRIG  No results found for: HDL  No results found for: LDLCALC  No results found for: LABVLDL      Chemistry        Component Value Date/Time     08/12/2012 2343    K 3.8 08/12/2012 2343     08/12/2012 2343    CO2 27 08/12/2012 2343    BUN 12 08/12/2012 2343    CREATININE 0.7 08/12/2012 2343        Component Value Date/Time    CALCIUM 9.6 08/12/2012 2343            No results found for: TSH, E4RBHZC, G6FPZWL, THYROIDAB    Lab Results   Component Value Date    WBC 9.9 03/02/2019    HGB 9.4 (L) 03/02/2019    HCT 28.1 (L) 03/02/2019    MCV 92.1 03/02/2019     (L) 03/02/2019         Health Maintenance   Topic Date Due    COVID-19 Vaccine (1) Never done    Varicella vaccine (1 of 2 - 2-dose childhood series) Never done    Depression Screen  Never done    HIV screen  Never done    Hepatitis C screen  Never done    DTaP/Tdap/Td vaccine (1 - Tdap) Never done    Pap smear  Never done    Flu vaccine (1) Never done    Hepatitis A vaccine  Aged Out    Hib vaccine  Aged Out    Meningococcal (ACWY) vaccine  Aged Out    Pneumococcal 0-64 years Vaccine  Aged Out       There is no immunization history for the selected administration types on file for this patient. Review of Systems   Constitutional:  Positive for fatigue. Negative for chills and fever. HENT: Negative. Respiratory:  Negative for cough and shortness of breath. Cardiovascular:  Negative for chest pain. Gastrointestinal:  Negative for abdominal pain and nausea. Genitourinary:  Positive for menstrual problem. Skin:  Negative for rash. Neurological:  Negative for dizziness, light-headedness and headaches. Psychiatric/Behavioral: Negative. Objective:   Physical Exam  Constitutional:       General: She is not in acute distress. Eyes:      Pupils: Pupils are equal, round, and reactive to light. Neck:      Thyroid: No thyroid mass, thyromegaly or thyroid tenderness. Cardiovascular:      Rate and Rhythm: Normal rate and regular rhythm. Heart sounds: No murmur heard. Pulmonary:      Effort: Pulmonary effort is normal.      Breath sounds: Normal breath sounds. No wheezing. Abdominal:      General: Bowel sounds are normal. There is no distension. Palpations: Abdomen is soft. Tenderness: There is no abdominal tenderness. Musculoskeletal:         General: No tenderness. Normal range of motion. Cervical back: Normal range of motion and neck supple. Lymphadenopathy:      Cervical: No cervical adenopathy. Skin:     General: Skin is warm and dry. Findings: No rash. Assessment:       Diagnosis Orders   1. Well adult exam  CBC    Lipid Panel    Comprehensive Metabolic Panel    Hemoglobin A1C    TSH    T4, Free      2. Hypothyroidism due to Hashimoto's thyroiditis                Plan:      Pmhx reviewed  Screening Lab  Restart Synthroid 50 mcg  Recheck TSH in 2 months  Healthy Lifestyles discussed  RTO in 2 months    Current Outpatient Medications   Medication Sig Dispense Refill    levothyroxine (SYNTHROID) 100 MCG tablet Take 100 mcg by mouth Daily       No current facility-administered medications for this visit.

## 2022-11-22 LAB
ABSOLUTE BASO #: 0.02 K/UL (ref 0–0.2)
ABSOLUTE EOS #: 0.05 K/UL (ref 0–0.5)
ABSOLUTE LYMPH #: 0.84 K/UL (ref 1–4)
ABSOLUTE MONO #: 0.14 K/UL (ref 0.2–1)
ABSOLUTE NEUT #: 1.65 K/UL (ref 1.5–7.5)
ALBUMIN SERPL-MCNC: 4.9 G/DL (ref 3.5–5.2)
ALK PHOSPHATASE: 41 U/L (ref 40–112)
ALT SERPL-CCNC: 17 U/L (ref 5–40)
ANION GAP SERPL CALCULATED.3IONS-SCNC: 10 MEQ/L (ref 7–16)
AST SERPL-CCNC: 35 U/L (ref 9–40)
AVERAGE GLUCOSE: 100 MG/DL
BASOPHILS RELATIVE PERCENT: 0.7 %
BILIRUB SERPL-MCNC: 0.5 MG/DL
BUN BLDV-MCNC: 16 MG/DL (ref 6–20)
CALCIUM SERPL-MCNC: 9.2 MG/DL (ref 8.5–10.5)
CHLORIDE BLD-SCNC: 105 MEQ/L (ref 95–107)
CHOLESTEROL/HDL RATIO: 2.4 RATIO
CHOLESTEROL: 197 MG/DL
CO2: 28 MEQ/L (ref 19–31)
CREAT SERPL-MCNC: 1.41 MG/DL (ref 0.6–1.3)
EGFR IF NONAFRICAN AMERICAN: 52 ML/MIN/1.73
EOSINOPHILS RELATIVE PERCENT: 1.9 %
GLUCOSE: 84 MG/DL (ref 70–99)
HBA1C MFR BLD: 5.1 % (ref 4.2–5.6)
HCT VFR BLD CALC: 27.3 % (ref 34–45)
HDLC SERPL-MCNC: 81 MG/DL
HEMOGLOBIN: 8.2 G/DL (ref 11.5–15.5)
LDL CHOLESTEROL CALCULATED: 103 MG/DL
LDL/HDL RATIO: 1.3 RATIO
LYMPHOCYTE %: 31.1 %
MCH RBC QN AUTO: 21.9 PG (ref 25–33)
MCHC RBC AUTO-ENTMCNC: 30 G/DL (ref 31–36)
MCV RBC AUTO: 72.8 FL (ref 80–99)
MONOCYTES # BLD: 5.2 %
NEUTROPHILS RELATIVE PERCENT: 61.1 %
PDW BLD-RTO: 17.6 % (ref 11.5–15)
PLATELETS: 170 K/UL (ref 130–400)
PMV BLD AUTO: 11.9 FL (ref 9.3–13)
POTASSIUM SERPL-SCNC: 4.1 MEQ/L (ref 3.5–5.4)
RBC: 3.75 M/UL (ref 3.8–5.4)
SODIUM BLD-SCNC: 143 MEQ/L (ref 133–146)
TOTAL PROTEIN: 7.1 G/DL (ref 6.1–8.3)
TRIGL SERPL-MCNC: 65 MG/DL
VLDLC SERPL CALC-MCNC: 13 MG/DL
WBC: 2.7 K/UL (ref 3.5–11)

## 2022-11-23 DIAGNOSIS — D64.9 HEMOGLOBIN LOW: ICD-10-CM

## 2022-11-23 DIAGNOSIS — D50.0 IRON DEFICIENCY ANEMIA DUE TO CHRONIC BLOOD LOSS: Primary | ICD-10-CM

## 2022-11-23 DIAGNOSIS — N92.0 MENORRHAGIA WITH REGULAR CYCLE: ICD-10-CM

## 2022-11-23 LAB
T4 FREE: 0.31 NG/DL (ref 0.8–1.9)
TSH SERPL DL<=0.05 MIU/L-ACNC: 352 UIU/ML (ref 0.4–4.1)

## 2022-11-27 DIAGNOSIS — E06.3 HYPOTHYROIDISM DUE TO HASHIMOTO'S THYROIDITIS: Primary | ICD-10-CM

## 2022-11-27 DIAGNOSIS — E03.8 HYPOTHYROIDISM DUE TO HASHIMOTO'S THYROIDITIS: Primary | ICD-10-CM

## 2022-11-27 RX ORDER — LEVOTHYROXINE SODIUM 0.1 MG/1
100 TABLET ORAL DAILY
Qty: 90 TABLET | Refills: 0 | Status: SHIPPED | OUTPATIENT
Start: 2022-11-27

## 2022-12-02 ENCOUNTER — HOSPITAL ENCOUNTER (OUTPATIENT)
Dept: INFUSION THERAPY | Age: 27
Discharge: HOME | End: 2022-12-02
Payer: COMMERCIAL

## 2022-12-02 ENCOUNTER — OFFICE VISIT (OUTPATIENT)
Dept: ONCOLOGY | Age: 27
End: 2022-12-02
Payer: COMMERCIAL

## 2022-12-02 VITALS
WEIGHT: 120 LBS | OXYGEN SATURATION: 100 % | RESPIRATION RATE: 16 BRPM | TEMPERATURE: 98.2 F | DIASTOLIC BLOOD PRESSURE: 72 MMHG | SYSTOLIC BLOOD PRESSURE: 160 MMHG | BODY MASS INDEX: 19.29 KG/M2 | HEART RATE: 72 BPM | HEIGHT: 66 IN

## 2022-12-02 VITALS
OXYGEN SATURATION: 100 % | TEMPERATURE: 98.2 F | SYSTOLIC BLOOD PRESSURE: 160 MMHG | HEART RATE: 72 BPM | RESPIRATION RATE: 16 BRPM | DIASTOLIC BLOOD PRESSURE: 72 MMHG

## 2022-12-02 DIAGNOSIS — D50.0 IRON DEFICIENCY ANEMIA DUE TO CHRONIC BLOOD LOSS: ICD-10-CM

## 2022-12-02 DIAGNOSIS — D50.9 MICROCYTIC ANEMIA: Primary | ICD-10-CM

## 2022-12-02 DIAGNOSIS — D50.9 MICROCYTIC ANEMIA: ICD-10-CM

## 2022-12-02 LAB
ABSOLUTE IMMATURE GRANULOCYTE: 0 THOU/MM3 (ref 0–0.07)
BASINOPHIL, AUTOMATED: 1 % (ref 0–3)
BASOPHILS ABSOLUTE: 0 THOU/MM3 (ref 0–0.1)
EOSINOPHILS ABSOLUTE: 0 THOU/MM3 (ref 0–0.4)
EOSINOPHILS RELATIVE PERCENT: 1 % (ref 0–4)
FERRITIN: 4 NG/ML (ref 10–291)
FOLATE: 14.6 NG/ML (ref 4.8–24.2)
HCT VFR BLD CALC: 26.3 % (ref 37–47)
HEMOGLOBIN: 7.7 GM/DL (ref 12–16)
IMMATURE GRANULOCYTES: 0 %
IRON: 17 UG/DL (ref 50–170)
LYMPHOCYTES # BLD: 23 % (ref 15–47)
LYMPHOCYTES ABSOLUTE: 0.6 THOU/MM3 (ref 1–4.8)
MCH RBC QN AUTO: 22 PG (ref 26–33)
MCHC RBC AUTO-ENTMCNC: 29.3 GM/DL (ref 32.2–35.5)
MCV RBC AUTO: 75 FL (ref 81–99)
MONOCYTES ABSOLUTE: 0.2 THOU/MM3 (ref 0.4–1.3)
MONOCYTES: 8 % (ref 0–12)
PDW BLD-RTO: 18.6 % (ref 11.5–14.5)
PLATELET # BLD: 202 THOU/MM3 (ref 130–400)
PMV BLD AUTO: 10.5 FL (ref 9.4–12.4)
RBC # BLD: 3.5 MILL/MM3 (ref 4.2–5.4)
SEG NEUTROPHILS: 67 % (ref 43–75)
SEGMENTED NEUTROPHILS ABSOLUTE COUNT: 1.8 THOU/MM3 (ref 1.8–7.7)
TOTAL IRON BINDING CAPACITY: 394 UG/DL (ref 171–450)
VITAMIN B-12: 481 PG/ML (ref 211–911)
WBC # BLD: 2.6 THOU/MM3 (ref 4.8–10.8)

## 2022-12-02 PROCEDURE — 82607 VITAMIN B-12: CPT

## 2022-12-02 PROCEDURE — 99211 OFF/OP EST MAY X REQ PHY/QHP: CPT

## 2022-12-02 PROCEDURE — 83540 ASSAY OF IRON: CPT

## 2022-12-02 PROCEDURE — 99204 OFFICE O/P NEW MOD 45 MIN: CPT | Performed by: PHYSICIAN ASSISTANT

## 2022-12-02 PROCEDURE — 36415 COLL VENOUS BLD VENIPUNCTURE: CPT

## 2022-12-02 PROCEDURE — 85025 COMPLETE CBC W/AUTO DIFF WBC: CPT

## 2022-12-02 PROCEDURE — 82746 ASSAY OF FOLIC ACID SERUM: CPT

## 2022-12-02 PROCEDURE — 83550 IRON BINDING TEST: CPT

## 2022-12-02 PROCEDURE — 82728 ASSAY OF FERRITIN: CPT

## 2022-12-02 RX ORDER — DIPHENHYDRAMINE HYDROCHLORIDE 50 MG/ML
50 INJECTION INTRAMUSCULAR; INTRAVENOUS
OUTPATIENT
Start: 2022-12-02

## 2022-12-02 RX ORDER — SODIUM CHLORIDE 9 MG/ML
5-250 INJECTION, SOLUTION INTRAVENOUS PRN
OUTPATIENT
Start: 2022-12-02

## 2022-12-02 RX ORDER — ONDANSETRON 2 MG/ML
8 INJECTION INTRAMUSCULAR; INTRAVENOUS
OUTPATIENT
Start: 2022-12-02

## 2022-12-02 RX ORDER — ALBUTEROL SULFATE 90 UG/1
4 AEROSOL, METERED RESPIRATORY (INHALATION) PRN
OUTPATIENT
Start: 2022-12-02

## 2022-12-02 RX ORDER — ACETAMINOPHEN 325 MG/1
650 TABLET ORAL
OUTPATIENT
Start: 2022-12-02

## 2022-12-02 RX ORDER — EPINEPHRINE 1 MG/ML
0.3 INJECTION, SOLUTION, CONCENTRATE INTRAVENOUS PRN
OUTPATIENT
Start: 2022-12-02

## 2022-12-02 RX ORDER — SODIUM CHLORIDE 9 MG/ML
INJECTION, SOLUTION INTRAVENOUS CONTINUOUS
OUTPATIENT
Start: 2022-12-02

## 2022-12-02 RX ORDER — FAMOTIDINE 10 MG/ML
20 INJECTION, SOLUTION INTRAVENOUS
OUTPATIENT
Start: 2022-12-02

## 2022-12-02 RX ORDER — SODIUM CHLORIDE 0.9 % (FLUSH) 0.9 %
5-40 SYRINGE (ML) INJECTION PRN
OUTPATIENT
Start: 2022-12-02

## 2022-12-02 RX ORDER — HEPARIN SODIUM (PORCINE) LOCK FLUSH IV SOLN 100 UNIT/ML 100 UNIT/ML
500 SOLUTION INTRAVENOUS PRN
OUTPATIENT
Start: 2022-12-02

## 2022-12-02 NOTE — PROGRESS NOTES
[facility] [address]  [address] [address]  Dept: [phone]  Dept Fax: [phone]: [phone]      Visit Date:12/2/2022     Karen Tam is a 28 y.o. female who presents today for:   Chief Complaint   Patient presents with    New Patient      Iron deficiency anemia due to chronic blood loss        HPI:   Karen Tam is a 28 y.o. female referred to Hematology/Oncology clinic for evaluation of iron deficiency anemia due to chronic blood loss per her PCP, SEAN Delong. The patient was evaluated by her PCP to establish care on 11/10/2022. She had routine lab work completed including CBC which showed Hgb 8.2, Hct 27.3, MCV 72.8. she was referred for further evaluation. CBC also showed WBC count 2.7 and platelet count 405,849. .00 and free T4 0.31. She was restarted on synthroid. She denies epistaxis, hemoptysis, hematemesis, melena, hematochezia, hematuria. She affirms heavy menstrual cycles which last approximately 7 days. She affirms passing clots throughout her cycle with heaviest days 1 through 3. Her LMP was 11/20/22. She affirms upcoming appointment with gynecology in January 2023. She denies history of malabsorptive disorder such as IBD or celiac disease. She denies prior gastrointestinal surgeries. She affirms adequate intake of oral iron in her diet. The patient affirms fatigue which is chronic. She affirms intermittent lightheadedness, near syncope. She affirms pica symptoms with craving ice. Denies dyspnea on exertion or palpitations. PMH includes hypothyroidism. Denies alcohol or tobacco use.        Past Medical History:   Diagnosis Date    Thyroid disease     hypothyroid      Past Surgical History:   Procedure Laterality Date    FRACTURE SURGERY      left arm with plate and screws    WISDOM TOOTH EXTRACTION        Family History   Problem Relation Age of Onset    Heart Disease Father     Heart Attack Father Arthritis Neg Hx     Asthma Neg Hx     Atrial Fibrillation Neg Hx     Birth Defects Neg Hx       Social History     Tobacco Use    Smoking status: Never    Smokeless tobacco: Never   Substance Use Topics    Alcohol use: Yes     Comment: soc      Current Outpatient Medications   Medication Sig Dispense Refill    levothyroxine (SYNTHROID) 100 MCG tablet Take 1 tablet by mouth daily 90 tablet 0     No current facility-administered medications for this visit. No Known Allergies       Review of Systems:   Review of Systems   Pertinent review of systems noted in HPI, all other ROS negative. Objective:   Physical Exam   BP (!) 160/72 (Site: Left Upper Arm, Position: Sitting, Cuff Size: Medium Adult)   Pulse 72   Temp 98.2 °F (36.8 °C) (Oral)   Resp 16   Ht 5' 6\" (1.676 m)   Wt 120 lb (54.4 kg)   SpO2 100%   BMI 19.37 kg/m²    General appearance: No apparent distress, well developed, pale and cooperative. HEENT: Pupils equal, round, and reactive to light. Conjunctivae/corneas clear. Oral mucosa moist.   Neck: Supple, with full range of motion. Trachea midline. Respiratory:  Normal respiratory effort. Clear to auscultation, bilaterally without Rales/Wheezes/Rhonchi. Cardiovascular: Regular rate and rhythm with normal S1/S2  Abdomen: Soft, active bowel sounds. Musculoskeletal: No clubbing, cyanosis or edema bilaterally. Ambulates in office. Skin: Skin color - pale, texture, turgor normal.  No visible rashes or lesions. Neurologic:  Neurovascularly intact without any focal sensory/motor deficits.    Psychiatric: Alert and oriented      Imaging Studies and Labs:   CBC:   Lab Results   Component Value Date    WBC 2.7 (L) 11/21/2022    HGB 8.2 (L) 11/21/2022    HCT 27.3 (L) 11/21/2022    MCV 72.8 (L) 11/21/2022     11/21/2022     BMP:   Lab Results   Component Value Date/Time     11/21/2022 09:36 AM    K 4.1 11/21/2022 09:36 AM     11/21/2022 09:36 AM    CO2 28 11/21/2022 09:36 AM    BUN 16 11/21/2022 09:36 AM    CREATININE 1.41 11/21/2022 09:36 AM    GLUCOSE 84 11/21/2022 09:36 AM    CALCIUM 9.2 11/21/2022 09:36 AM      LFT:   Lab Results   Component Value Date    ALT 17 11/21/2022    AST 35 11/21/2022    ALKPHOS 41 11/21/2022    BILITOT 0.5 11/21/2022         Assessment and Plan:   1. Microcytic anemia  CBC on 11/21/22 showed Hgb 8.2, Hct 27.3, MCV 72.8. WBC count 2.7 and platelet count 669,142. Microcytosis likely related to iron deficiency anemia from chronic blood loss. Patient has heavy menses. Also uncontrolled hypothyroidism, recently restarted on Synthroid. Prior CBC on 3/2/2019 patient was 1 day postpartum with Hgb 9.4, hct 28.1.    -Will obtain ferritin, iron, TIBC, vitamin B12 and folate today   -Will schedule for IV Venofer, total of 3 infusions to be given at least one week apart    -Will continue to monitor Hgb/Hct and iron levels   -Patient instructed to monitor for signs/symptoms of anemia or blood loss   -Return to clinic in 8 weeks   -CBC, ferritin, iron, TIBC on RTC     2. Iron deficiency anemia due to chronic blood loss  3. Leukopenia  Possibly related to uncontrolled hypothyroidism. Will follow. Return in about 8 weeks (around 1/27/2023). All patient questions answered. Pt voiced understanding. Patient agreed with treatment plan. Follow up as directed. Patient instructed to call for questions or concerns.      Electronically signed by   Axel Busch PA-C

## 2022-12-02 NOTE — PATIENT INSTRUCTIONS
Proceed with IV Venofer, total of 3 infusions to be given at least one week apart. Return to clinic in 8 weeks. Obtain ferritin, iron, TIBC, folate, B12 and CBC today  Labs on RTC: CBC, ferritin, iron, TIBC  Please call for questions or concerns.

## 2022-12-30 ENCOUNTER — HOSPITAL ENCOUNTER (OUTPATIENT)
Dept: INFUSION THERAPY | Age: 27
Discharge: HOME | End: 2022-12-30
Payer: COMMERCIAL

## 2022-12-30 VITALS
OXYGEN SATURATION: 97 % | SYSTOLIC BLOOD PRESSURE: 104 MMHG | TEMPERATURE: 98.1 F | HEART RATE: 69 BPM | DIASTOLIC BLOOD PRESSURE: 61 MMHG | RESPIRATION RATE: 18 BRPM

## 2022-12-30 DIAGNOSIS — D50.9 MICROCYTIC ANEMIA: Primary | ICD-10-CM

## 2022-12-30 DIAGNOSIS — D50.0 IRON DEFICIENCY ANEMIA DUE TO CHRONIC BLOOD LOSS: ICD-10-CM

## 2022-12-30 PROCEDURE — 96366 THER/PROPH/DIAG IV INF ADDON: CPT

## 2022-12-30 PROCEDURE — 96365 THER/PROPH/DIAG IV INF INIT: CPT

## 2022-12-30 PROCEDURE — 6360000002 HC RX W HCPCS: Performed by: PHYSICIAN ASSISTANT

## 2022-12-30 PROCEDURE — 2580000003 HC RX 258: Performed by: PHYSICIAN ASSISTANT

## 2022-12-30 RX ORDER — ONDANSETRON 2 MG/ML
8 INJECTION INTRAMUSCULAR; INTRAVENOUS
Status: CANCELLED | OUTPATIENT
Start: 2023-01-06

## 2022-12-30 RX ORDER — HEPARIN SODIUM (PORCINE) LOCK FLUSH IV SOLN 100 UNIT/ML 100 UNIT/ML
500 SOLUTION INTRAVENOUS PRN
Status: CANCELLED | OUTPATIENT
Start: 2023-01-06

## 2022-12-30 RX ORDER — SODIUM CHLORIDE 9 MG/ML
INJECTION, SOLUTION INTRAVENOUS CONTINUOUS
Status: CANCELLED | OUTPATIENT
Start: 2023-01-06

## 2022-12-30 RX ORDER — SODIUM CHLORIDE 9 MG/ML
INJECTION, SOLUTION INTRAVENOUS CONTINUOUS
Status: DISCONTINUED | OUTPATIENT
Start: 2022-12-30 | End: 2022-12-31 | Stop reason: HOSPADM

## 2022-12-30 RX ORDER — ACETAMINOPHEN 325 MG/1
650 TABLET ORAL
Status: CANCELLED | OUTPATIENT
Start: 2023-01-06

## 2022-12-30 RX ORDER — SODIUM CHLORIDE 9 MG/ML
5-250 INJECTION, SOLUTION INTRAVENOUS PRN
Status: CANCELLED | OUTPATIENT
Start: 2023-01-06

## 2022-12-30 RX ORDER — DIPHENHYDRAMINE HYDROCHLORIDE 50 MG/ML
50 INJECTION INTRAMUSCULAR; INTRAVENOUS
Status: CANCELLED | OUTPATIENT
Start: 2023-01-06

## 2022-12-30 RX ORDER — ALBUTEROL SULFATE 90 UG/1
4 AEROSOL, METERED RESPIRATORY (INHALATION) PRN
Status: CANCELLED | OUTPATIENT
Start: 2023-01-06

## 2022-12-30 RX ORDER — SODIUM CHLORIDE 0.9 % (FLUSH) 0.9 %
5-40 SYRINGE (ML) INJECTION PRN
Status: CANCELLED | OUTPATIENT
Start: 2023-01-06

## 2022-12-30 RX ADMIN — SODIUM CHLORIDE: 9 INJECTION, SOLUTION INTRAVENOUS at 13:45

## 2022-12-30 RX ADMIN — IRON SUCROSE 300 MG: 20 INJECTION, SOLUTION INTRAVENOUS at 13:46

## 2022-12-30 NOTE — DISCHARGE SUMMARY
Patient assessed for the following post Venofer infusion:    Dizziness   No  Lightheadedness  No     Acute nausea/vomiting No  Headache   No  Chest pain/pressure  No  Rash/itching   No  Shortness of breath  No    Patient kept for 20 minutes observation post infusion of Venofer. Patient tolerated infusion of venofer without any complications. Last vital signs:   /61   Pulse 69   Temp 98.1 °F (36.7 °C) (Oral)   Resp 18   SpO2 97%     Patient instructed if experience any of the above symptoms following today's infusion,he/she is to notify MD immediately or go to the emergency department. Discharge instructions given to patient. Verbalizes understanding. Ambulated off unit per self with belongings.

## 2022-12-30 NOTE — DISCHARGE INSTRUCTIONS
Please contact your Oncologist if you have any questions regarding the infusion of venofer that you received today. Patient instructed if experience any of the symptoms following today's infusion / to notify MD immediately or go to emergency department.     * dizziness/lightheadedness  *acute nausea/vomiting - not relieved with medication  *headache - not relieved from Tylenol/pain medication  *chest pain/pressure  *rash/itching  *shortness of breath        Drink fluids - 48oz fluids daily  Call if develop fever/ chills/ signs or symptoms of infection

## 2022-12-30 NOTE — PLAN OF CARE
Problem: Safety - Adult  Goal: Free from fall injury  Outcome: Adequate for Discharge  Flowsheets (Taken 12/30/2022 1342)  Free From Fall Injury: Instruct family/caregiver on patient safety  Note: Patient verbalizes understanding of fall precautions. Patient free from falls this visit. Problem: Discharge Planning  Goal: Discharge to home or other facility with appropriate resources  Outcome: Adequate for Discharge  Flowsheets (Taken 12/30/2022 1342)  Discharge to home or other facility with appropriate resources: Identify barriers to discharge with patient and caregiver  Note: Verbalized understanding of discharge instructions, follow-up appointments, and when to call the physician. Care plan reviewed with patient. Patient verbalizes understanding of the plan of care and contribute to goal setting.

## 2023-01-06 ENCOUNTER — HOSPITAL ENCOUNTER (OUTPATIENT)
Dept: INFUSION THERAPY | Age: 28
Discharge: HOME | End: 2023-01-06
Payer: COMMERCIAL

## 2023-01-06 VITALS
DIASTOLIC BLOOD PRESSURE: 51 MMHG | WEIGHT: 117.2 LBS | SYSTOLIC BLOOD PRESSURE: 90 MMHG | BODY MASS INDEX: 18.92 KG/M2 | OXYGEN SATURATION: 98 % | TEMPERATURE: 98.4 F | RESPIRATION RATE: 18 BRPM | HEART RATE: 87 BPM

## 2023-01-06 DIAGNOSIS — D50.0 IRON DEFICIENCY ANEMIA DUE TO CHRONIC BLOOD LOSS: ICD-10-CM

## 2023-01-06 DIAGNOSIS — D50.9 MICROCYTIC ANEMIA: Primary | ICD-10-CM

## 2023-01-06 PROCEDURE — 6360000002 HC RX W HCPCS: Performed by: PHYSICIAN ASSISTANT

## 2023-01-06 PROCEDURE — 96366 THER/PROPH/DIAG IV INF ADDON: CPT

## 2023-01-06 PROCEDURE — 2580000003 HC RX 258: Performed by: PHYSICIAN ASSISTANT

## 2023-01-06 PROCEDURE — 96365 THER/PROPH/DIAG IV INF INIT: CPT

## 2023-01-06 RX ORDER — SODIUM CHLORIDE 9 MG/ML
5-250 INJECTION, SOLUTION INTRAVENOUS PRN
OUTPATIENT
Start: 2023-01-13

## 2023-01-06 RX ORDER — ACETAMINOPHEN 325 MG/1
650 TABLET ORAL
OUTPATIENT
Start: 2023-01-13

## 2023-01-06 RX ORDER — SODIUM CHLORIDE 9 MG/ML
INJECTION, SOLUTION INTRAVENOUS CONTINUOUS
Status: DISCONTINUED | OUTPATIENT
Start: 2023-01-06 | End: 2023-01-07 | Stop reason: HOSPADM

## 2023-01-06 RX ORDER — SODIUM CHLORIDE 9 MG/ML
INJECTION, SOLUTION INTRAVENOUS CONTINUOUS
OUTPATIENT
Start: 2023-01-13

## 2023-01-06 RX ORDER — HEPARIN SODIUM (PORCINE) LOCK FLUSH IV SOLN 100 UNIT/ML 100 UNIT/ML
500 SOLUTION INTRAVENOUS PRN
OUTPATIENT
Start: 2023-01-13

## 2023-01-06 RX ORDER — DIPHENHYDRAMINE HYDROCHLORIDE 50 MG/ML
50 INJECTION INTRAMUSCULAR; INTRAVENOUS
OUTPATIENT
Start: 2023-01-13

## 2023-01-06 RX ORDER — ONDANSETRON 2 MG/ML
8 INJECTION INTRAMUSCULAR; INTRAVENOUS
OUTPATIENT
Start: 2023-01-13

## 2023-01-06 RX ORDER — SODIUM CHLORIDE 0.9 % (FLUSH) 0.9 %
5-40 SYRINGE (ML) INJECTION PRN
OUTPATIENT
Start: 2023-01-13

## 2023-01-06 RX ORDER — ALBUTEROL SULFATE 90 UG/1
4 AEROSOL, METERED RESPIRATORY (INHALATION) PRN
OUTPATIENT
Start: 2023-01-13

## 2023-01-06 RX ADMIN — SODIUM CHLORIDE: 9 INJECTION, SOLUTION INTRAVENOUS at 12:39

## 2023-01-06 RX ADMIN — IRON SUCROSE 300 MG: 20 INJECTION, SOLUTION INTRAVENOUS at 12:40

## 2023-01-06 NOTE — PLAN OF CARE
Problem: Chronic Conditions and Co-morbidities  Goal: Patient's chronic conditions and co-morbidity symptoms are monitored and maintained or improved  1/6/2023 1416 by Evan Flores RN  Outcome: Adequate for Discharge  Flowsheets (Taken 1/6/2023 1416)  Care Plan - Patient's Chronic Conditions and Co-Morbidity Symptoms are Monitored and Maintained or Improved:   Monitor and assess patient's chronic conditions and comorbid symptoms for stability, deterioration, or improvement   Collaborate with multidisciplinary team to address chronic and comorbid conditions and prevent exacerbation or deterioration  Note: Patient verbalizes understanding to verbal information given on Venofer,action and possible side effects. Aware to call MD if develop complications. 1/6/2023 1416 by Evan Flores RN  Outcome: Adequate for Discharge  Flowsheets (Taken 1/6/2023 1416)  Care Plan - Patient's Chronic Conditions and Co-Morbidity Symptoms are Monitored and Maintained or Improved:   Monitor and assess patient's chronic conditions and comorbid symptoms for stability, deterioration, or improvement   Collaborate with multidisciplinary team to address chronic and comorbid conditions and prevent exacerbation or deterioration     Problem: Safety - Adult  Goal: Free from fall injury  Outcome: Adequate for Discharge  Flowsheets (Taken 1/6/2023 1416)  Free From Fall Injury:   Instruct family/caregiver on patient safety   Based on caregiver fall risk screen, instruct family/caregiver to ask for assistance with transferring infant if caregiver noted to have fall risk factors  Note: Free from falls while in O.P. Oncology.       Problem: Discharge Planning  Goal: Discharge to home or other facility with appropriate resources  Outcome: Adequate for Discharge  Flowsheets (Taken 1/6/2023 1416)  Discharge to home or other facility with appropriate resources:   Identify barriers to discharge with patient and caregiver   Arrange for needed discharge resources and transportation as appropriate   Identify discharge learning needs (meds, wound care, etc)  Note: Verbalize understanding of discharge instructions, follow up appointments, and when to call Physician. Care plan reviewed with patient. Patient  verbalize understanding of the plan of care and contribute to goal setting.

## 2023-01-06 NOTE — PROGRESS NOTES
Patient assessed for the following post venofer:    Dizziness   No  Lightheadedness  No      Acute nausea/vomiting No  Headache   No  Chest pain/pressure  No  Rash/itching   No  Shortness of breath  No    Patient kept for 20 minutes observation post infusion. Patient tolerated venofer without any complications. Last vital signs:   BP (!) 90/51   Pulse 87   Temp 98.4 °F (36.9 °C) (Oral)   Resp 18   Wt 117 lb 3.2 oz (53.2 kg)   SpO2 98%   BMI 18.92 kg/m²       Patient instructed if experience any of the above symptoms following today's infusion,he/she is to notify MD immediately or go to the emergency department. Discharge instructions given to patient. Verbalizes understanding. Ambulated off unit per self  with belongings.

## 2023-01-10 ENCOUNTER — OFFICE VISIT (OUTPATIENT)
Dept: FAMILY MEDICINE CLINIC | Age: 28
End: 2023-01-10
Payer: COMMERCIAL

## 2023-01-10 VITALS
SYSTOLIC BLOOD PRESSURE: 102 MMHG | RESPIRATION RATE: 12 BRPM | HEART RATE: 72 BPM | WEIGHT: 117.5 LBS | BODY MASS INDEX: 18.96 KG/M2 | DIASTOLIC BLOOD PRESSURE: 60 MMHG

## 2023-01-10 DIAGNOSIS — E03.8 HYPOTHYROIDISM DUE TO HASHIMOTO'S THYROIDITIS: Primary | ICD-10-CM

## 2023-01-10 DIAGNOSIS — E06.3 HYPOTHYROIDISM DUE TO HASHIMOTO'S THYROIDITIS: Primary | ICD-10-CM

## 2023-01-10 DIAGNOSIS — D50.0 IRON DEFICIENCY ANEMIA DUE TO CHRONIC BLOOD LOSS: ICD-10-CM

## 2023-01-10 DIAGNOSIS — N92.0 MENORRHAGIA WITH REGULAR CYCLE: ICD-10-CM

## 2023-01-10 DIAGNOSIS — R53.83 OTHER FATIGUE: ICD-10-CM

## 2023-01-10 PROCEDURE — 99213 OFFICE O/P EST LOW 20 MIN: CPT | Performed by: NURSE PRACTITIONER

## 2023-01-10 ASSESSMENT — ENCOUNTER SYMPTOMS
ABDOMINAL PAIN: 0
NAUSEA: 0
COUGH: 0
SHORTNESS OF BREATH: 0

## 2023-01-10 ASSESSMENT — PATIENT HEALTH QUESTIONNAIRE - PHQ9
1. LITTLE INTEREST OR PLEASURE IN DOING THINGS: 0
SUM OF ALL RESPONSES TO PHQ9 QUESTIONS 1 & 2: 0
SUM OF ALL RESPONSES TO PHQ QUESTIONS 1-9: 0
2. FEELING DOWN, DEPRESSED OR HOPELESS: 0
SUM OF ALL RESPONSES TO PHQ QUESTIONS 1-9: 0

## 2023-01-10 NOTE — PROGRESS NOTES
Subjective:      Patient ID: Tania Delacruz 1995 is a 28 y.o. female here for evaluation. HPI: 2 month Follow up    Chief Complaint   Patient presents with    Follow-up    Thyroid Problem       Hashimoto. Fatigue. Puffiness in face and eyes. Had been off medication for 6+ months . Last check 1 year ago. Last dose was 100 mcg but felt like it was too much. Was restarted on 50 mcg for TSH as below. Improvement noted in energy along with iron infusions. See below. Lab Results   Component Value Date    .000 (H) 11/21/2022       Heavy periods. Regular. Following with HEMAT for iron transfusions. Follow with OB for period control - has US upcoming. Lab Results   Component Value Date    WBC 2.6 (L) 12/02/2022    HGB 7.7 (L) 12/02/2022    HCT 26.3 (L) 12/02/2022     12/02/2022       Vitals:    01/10/23 1032   BP: 102/60   Pulse: 72   Resp: 12        Denies CP, SOB or chest tightness. Active. Regular exercise. BP Readings from Last 3 Encounters:   01/10/23 102/60   01/06/23 (!) 90/51   12/30/22 104/61       Due for screening labs.     Lab Results   Component Value Date    LABA1C 5.1 11/21/2022     No results found for: EAG    No components found for: CHLPL  Lab Results   Component Value Date    TRIG 65 11/21/2022     Lab Results   Component Value Date    HDL 81 11/21/2022     Lab Results   Component Value Date    LDLCALC 103 (H) 11/21/2022     Lab Results   Component Value Date    LABVLDL 13 11/21/2022         Chemistry        Component Value Date/Time     11/21/2022 0936    K 4.1 11/21/2022 0936     11/21/2022 0936    CO2 28 11/21/2022 0936    BUN 16 11/21/2022 0936    CREATININE 1.41 (H) 11/21/2022 0936        Component Value Date/Time    CALCIUM 9.2 11/21/2022 0936    ALKPHOS 41 11/21/2022 0936    AST 35 11/21/2022 0936    ALT 17 11/21/2022 0936    BILITOT 0.5 11/21/2022 0936            Lab Results   Component Value Date    .000 (H) 11/21/2022       Lab Results   Component Value Date    WBC 2.6 (L) 12/02/2022    HGB 7.7 (L) 12/02/2022    HCT 26.3 (L) 12/02/2022    MCV 75 (L) 12/02/2022     12/02/2022         Health Maintenance   Topic Date Due    COVID-19 Vaccine (1) Never done    Varicella vaccine (1 of 2 - 2-dose childhood series) Never done    HIV screen  Never done    Hepatitis C screen  Never done    DTaP/Tdap/Td vaccine (1 - Tdap) Never done    Pap smear  Never done    Flu vaccine (1) Never done    Depression Screen  11/10/2023    Hepatitis A vaccine  Aged Out    Hib vaccine  Aged Out    Meningococcal (ACWY) vaccine  Aged Out    Pneumococcal 0-64 years Vaccine  Aged Out       There is no immunization history for the selected administration types on file for this patient. Review of Systems   Constitutional:  Positive for fatigue. Negative for chills and fever. HENT: Negative. Respiratory:  Negative for cough and shortness of breath. Cardiovascular:  Negative for chest pain. Gastrointestinal:  Negative for abdominal pain and nausea. Genitourinary:  Positive for menstrual problem. Skin:  Negative for rash. Neurological:  Negative for dizziness, light-headedness and headaches. Psychiatric/Behavioral: Negative. Objective:   Physical Exam  Constitutional:       General: She is not in acute distress. Eyes:      Pupils: Pupils are equal, round, and reactive to light. Neck:      Thyroid: No thyroid mass, thyromegaly or thyroid tenderness. Cardiovascular:      Rate and Rhythm: Normal rate and regular rhythm. Heart sounds: No murmur heard. Pulmonary:      Effort: Pulmonary effort is normal.      Breath sounds: Normal breath sounds. No wheezing. Abdominal:      General: Bowel sounds are normal. There is no distension. Palpations: Abdomen is soft. Tenderness: There is no abdominal tenderness. Musculoskeletal:         General: No tenderness. Normal range of motion.       Cervical back: Normal range of motion and neck supple. Lymphadenopathy:      Cervical: No cervical adenopathy. Skin:     General: Skin is warm and dry. Findings: No rash. Assessment:       Diagnosis Orders   1. Hypothyroidism due to Hashimoto's thyroiditis        2. Iron deficiency anemia due to chronic blood loss        3. Menorrhagia with regular cycle  Basic Metabolic Panel      4. Other fatigue                  Plan:      Continue Synthroid 50 mcg  Recheck TSH now  Recheck BMP due to EDUARDO   - preworkout, poor fluid intake  Healthy Lifestyles discussed  Follow up with Specialists  Call with recs after testing    Current Outpatient Medications   Medication Sig Dispense Refill    levothyroxine (SYNTHROID) 100 MCG tablet Take 1 tablet by mouth daily 90 tablet 0     No current facility-administered medications for this visit.

## 2023-01-11 DIAGNOSIS — E06.3 HYPOTHYROIDISM DUE TO HASHIMOTO'S THYROIDITIS: ICD-10-CM

## 2023-01-11 DIAGNOSIS — E03.8 HYPOTHYROIDISM DUE TO HASHIMOTO'S THYROIDITIS: ICD-10-CM

## 2023-01-11 LAB
ANION GAP SERPL CALCULATED.3IONS-SCNC: 9 MEQ/L (ref 7–16)
BUN BLDV-MCNC: 15 MG/DL (ref 6–20)
CALCIUM SERPL-MCNC: 9.4 MG/DL (ref 8.5–10.5)
CHLORIDE BLD-SCNC: 105 MEQ/L (ref 95–107)
CO2: 27 MEQ/L (ref 19–31)
CREAT SERPL-MCNC: 1.15 MG/DL (ref 0.6–1.3)
EGFR IF NONAFRICAN AMERICAN: 67 ML/MIN/1.73
GLUCOSE: 55 MG/DL (ref 70–99)
POTASSIUM SERPL-SCNC: 4.3 MEQ/L (ref 3.5–5.4)
SODIUM BLD-SCNC: 141 MEQ/L (ref 133–146)
T4 FREE: 0.63 NG/DL (ref 0.8–1.9)
TSH SERPL DL<=0.05 MIU/L-ACNC: 71.5 UIU/ML (ref 0.4–4.1)

## 2023-01-11 RX ORDER — LEVOTHYROXINE SODIUM 0.07 MG/1
75 TABLET ORAL DAILY
Qty: 90 TABLET | Refills: 0 | Status: SHIPPED | OUTPATIENT
Start: 2023-01-11

## 2023-01-17 ENCOUNTER — HOSPITAL ENCOUNTER (OUTPATIENT)
Dept: INFUSION THERAPY | Age: 28
Discharge: HOME | End: 2023-01-17
Payer: COMMERCIAL

## 2023-01-17 VITALS
HEIGHT: 66 IN | SYSTOLIC BLOOD PRESSURE: 101 MMHG | RESPIRATION RATE: 16 BRPM | DIASTOLIC BLOOD PRESSURE: 56 MMHG | WEIGHT: 117 LBS | BODY MASS INDEX: 18.8 KG/M2 | OXYGEN SATURATION: 95 % | HEART RATE: 70 BPM | TEMPERATURE: 98.5 F

## 2023-01-17 DIAGNOSIS — D50.0 IRON DEFICIENCY ANEMIA DUE TO CHRONIC BLOOD LOSS: ICD-10-CM

## 2023-01-17 DIAGNOSIS — D50.9 MICROCYTIC ANEMIA: Primary | ICD-10-CM

## 2023-01-17 PROCEDURE — 96365 THER/PROPH/DIAG IV INF INIT: CPT

## 2023-01-17 PROCEDURE — 6360000002 HC RX W HCPCS: Performed by: PHYSICIAN ASSISTANT

## 2023-01-17 PROCEDURE — 96366 THER/PROPH/DIAG IV INF ADDON: CPT

## 2023-01-17 PROCEDURE — 2580000003 HC RX 258: Performed by: PHYSICIAN ASSISTANT

## 2023-01-17 RX ORDER — SODIUM CHLORIDE 9 MG/ML
INJECTION, SOLUTION INTRAVENOUS CONTINUOUS
OUTPATIENT
Start: 2023-01-20

## 2023-01-17 RX ORDER — HEPARIN SODIUM (PORCINE) LOCK FLUSH IV SOLN 100 UNIT/ML 100 UNIT/ML
500 SOLUTION INTRAVENOUS PRN
OUTPATIENT
Start: 2023-01-20

## 2023-01-17 RX ORDER — ONDANSETRON 2 MG/ML
8 INJECTION INTRAMUSCULAR; INTRAVENOUS
OUTPATIENT
Start: 2023-01-20

## 2023-01-17 RX ORDER — ALBUTEROL SULFATE 90 UG/1
4 AEROSOL, METERED RESPIRATORY (INHALATION) PRN
OUTPATIENT
Start: 2023-01-20

## 2023-01-17 RX ORDER — DIPHENHYDRAMINE HYDROCHLORIDE 50 MG/ML
50 INJECTION INTRAMUSCULAR; INTRAVENOUS
OUTPATIENT
Start: 2023-01-20

## 2023-01-17 RX ORDER — SODIUM CHLORIDE 9 MG/ML
INJECTION, SOLUTION INTRAVENOUS CONTINUOUS
Status: DISCONTINUED | OUTPATIENT
Start: 2023-01-17 | End: 2023-01-18 | Stop reason: HOSPADM

## 2023-01-17 RX ORDER — ACETAMINOPHEN 325 MG/1
650 TABLET ORAL
OUTPATIENT
Start: 2023-01-20

## 2023-01-17 RX ORDER — SODIUM CHLORIDE 9 MG/ML
5-250 INJECTION, SOLUTION INTRAVENOUS PRN
OUTPATIENT
Start: 2023-01-20

## 2023-01-17 RX ORDER — SODIUM CHLORIDE 0.9 % (FLUSH) 0.9 %
5-40 SYRINGE (ML) INJECTION PRN
OUTPATIENT
Start: 2023-01-20

## 2023-01-17 RX ADMIN — IRON SUCROSE 300 MG: 20 INJECTION, SOLUTION INTRAVENOUS at 13:56

## 2023-01-17 RX ADMIN — SODIUM CHLORIDE: 9 INJECTION, SOLUTION INTRAVENOUS at 13:43

## 2023-01-17 NOTE — PLAN OF CARE
Problem: Safety - Adult  Goal: Free from fall injury  Outcome: Adequate for Discharge  Flowsheets (Taken 1/17/2023 1622)  Free From Fall Injury:   Instruct family/caregiver on patient safety   Based on caregiver fall risk screen, instruct family/caregiver to ask for assistance with transferring infant if caregiver noted to have fall risk factors  Note: Free from falls while in O.P. Oncology. Problem: Discharge Planning  Goal: Discharge to home or other facility with appropriate resources  Outcome: Adequate for Discharge  Flowsheets (Taken 1/17/2023 1622)  Discharge to home or other facility with appropriate resources:   Identify barriers to discharge with patient and caregiver   Arrange for needed discharge resources and transportation as appropriate   Identify discharge learning needs (meds, wound care, etc)  Note: Verbalize understanding of discharge instructions, follow up appointments, and when to call Physician. Problem: Chronic Conditions and Co-morbidities  Goal: Patient's chronic conditions and co-morbidity symptoms are monitored and maintained or improved  Outcome: Adequate for Discharge  Flowsheets (Taken 1/17/2023 1622)  Care Plan - Patient's Chronic Conditions and Co-Morbidity Symptoms are Monitored and Maintained or Improved:   Monitor and assess patient's chronic conditions and comorbid symptoms for stability, deterioration, or improvement   Collaborate with multidisciplinary team to address chronic and comorbid conditions and prevent exacerbation or deterioration  Note: Patient verbalizes understanding to verbal information given on Venofer,action and possible side effects. Aware to call MD if develop complications. Care plan reviewed with patient. Patient  verbalize understanding of the plan of care and contribute to goal setting.

## 2023-01-26 DIAGNOSIS — D50.9 MICROCYTIC ANEMIA: Primary | ICD-10-CM

## 2023-01-26 DIAGNOSIS — D50.0 IRON DEFICIENCY ANEMIA DUE TO CHRONIC BLOOD LOSS: ICD-10-CM

## 2023-01-27 ENCOUNTER — OFFICE VISIT (OUTPATIENT)
Dept: ONCOLOGY | Age: 28
End: 2023-01-27
Payer: COMMERCIAL

## 2023-01-27 ENCOUNTER — HOSPITAL ENCOUNTER (OUTPATIENT)
Dept: INFUSION THERAPY | Age: 28
Discharge: HOME | End: 2023-01-27
Payer: COMMERCIAL

## 2023-01-27 VITALS
OXYGEN SATURATION: 96 % | DIASTOLIC BLOOD PRESSURE: 56 MMHG | WEIGHT: 117.6 LBS | HEIGHT: 66 IN | RESPIRATION RATE: 16 BRPM | SYSTOLIC BLOOD PRESSURE: 92 MMHG | TEMPERATURE: 98.1 F | HEART RATE: 72 BPM | BODY MASS INDEX: 18.9 KG/M2

## 2023-01-27 VITALS
HEART RATE: 72 BPM | SYSTOLIC BLOOD PRESSURE: 92 MMHG | RESPIRATION RATE: 16 BRPM | OXYGEN SATURATION: 96 % | DIASTOLIC BLOOD PRESSURE: 56 MMHG | TEMPERATURE: 98.1 F

## 2023-01-27 DIAGNOSIS — D50.9 MICROCYTIC ANEMIA: ICD-10-CM

## 2023-01-27 DIAGNOSIS — D50.0 IRON DEFICIENCY ANEMIA DUE TO CHRONIC BLOOD LOSS: ICD-10-CM

## 2023-01-27 DIAGNOSIS — D50.9 MICROCYTIC ANEMIA: Primary | ICD-10-CM

## 2023-01-27 DIAGNOSIS — D72.819 LEUKOPENIA, UNSPECIFIED TYPE: ICD-10-CM

## 2023-01-27 LAB
ABSOLUTE IMMATURE GRANULOCYTE: 0.01 THOU/MM3 (ref 0–0.07)
BASOPHILS ABSOLUTE: 0 THOU/MM3 (ref 0–0.1)
BASOPHILS NFR BLD AUTO: 0 % (ref 0–3)
EOSINOPHIL NFR BLD AUTO: 4 % (ref 0–4)
EOSINOPHILS ABSOLUTE: 0.1 THOU/MM3 (ref 0–0.4)
ERYTHROCYTE [DISTWIDTH] IN BLOOD BY AUTOMATED COUNT: 25.6 % (ref 11.5–14.5)
FERRITIN SERPL IA-MCNC: 104 NG/ML (ref 10–291)
HCT VFR BLD AUTO: 34.4 % (ref 37–47)
HGB BLD-MCNC: 10.5 GM/DL (ref 12–16)
IMMATURE GRANULOCYTES: 0 %
IRON SERPL-MCNC: 54 UG/DL (ref 50–170)
LYMPHOCYTES ABSOLUTE: 0.7 THOU/MM3 (ref 1–4.8)
LYMPHOCYTES NFR BLD AUTO: 27 % (ref 15–47)
MCH RBC QN AUTO: 25.4 PG (ref 26–33)
MCHC RBC AUTO-ENTMCNC: 30.5 GM/DL (ref 32.2–35.5)
MCV RBC AUTO: 83 FL (ref 81–99)
MONOCYTES ABSOLUTE: 0.3 THOU/MM3 (ref 0.4–1.3)
MONOCYTES NFR BLD AUTO: 10 % (ref 0–12)
NEUTROPHILS NFR BLD AUTO: 58 % (ref 43–75)
PLATELET # BLD AUTO: 130 THOU/MM3 (ref 130–400)
PMV BLD AUTO: 10.6 FL (ref 9.4–12.4)
RBC # BLD AUTO: 4.14 MILL/MM3 (ref 4.2–5.4)
SEGMENTED NEUTROPHILS ABSOLUTE COUNT: 1.5 THOU/MM3 (ref 1.8–7.7)
TIBC SERPL-MCNC: 288 UG/DL (ref 171–450)
WBC # BLD AUTO: 2.5 THOU/MM3 (ref 4.8–10.8)

## 2023-01-27 PROCEDURE — 36415 COLL VENOUS BLD VENIPUNCTURE: CPT

## 2023-01-27 PROCEDURE — 83550 IRON BINDING TEST: CPT

## 2023-01-27 PROCEDURE — 82728 ASSAY OF FERRITIN: CPT

## 2023-01-27 PROCEDURE — 99213 OFFICE O/P EST LOW 20 MIN: CPT | Performed by: PHYSICIAN ASSISTANT

## 2023-01-27 PROCEDURE — 83540 ASSAY OF IRON: CPT

## 2023-01-27 PROCEDURE — 99211 OFF/OP EST MAY X REQ PHY/QHP: CPT

## 2023-01-27 PROCEDURE — 85025 COMPLETE CBC W/AUTO DIFF WBC: CPT

## 2023-01-27 NOTE — PATIENT INSTRUCTIONS
Return to clinic in 12 weeks  Obtain CBC in 6 weeks (week of 3/10/23)  Labs on RTC  Please call for questions or concerns.

## 2023-01-27 NOTE — PROGRESS NOTES
[facility], [address] [address],[address]  [address] [address]  Dept: [phone]  Dept Fax: [phone]: [phone]      Visit Date:1/27/2023     Chandler Reyes is a 28 y.o. female who presents today for:   Chief Complaint   Patient presents with    Follow-up     Microcytic anemia        HPI:   Chandler Reyes is a 28 y.o. female referred to Hematology/Oncology clinic for evaluation of iron deficiency anemia due to chronic blood loss per her PCP, SEAN Ferrer. she was seen as a new patient on 12/2/22. The patient was evaluated by her PCP to establish care on 11/10/2022. She had routine lab work completed including CBC which showed Hgb 8.2, Hct 27.3, MCV 72.8. she was referred for further evaluation. CBC also showed WBC count 2.7 and platelet count 554,812. .00 and free T4 0.31. She was restarted on synthroid. She denies epistaxis, hemoptysis, hematemesis, melena, hematochezia, hematuria. She affirms heavy menstrual cycles which last approximately 7 days. She affirms passing clots throughout her cycle with heaviest days 1 through 3. Her LMP was 11/20/22. She affirms upcoming appointment with gynecology in January 2023. She denies history of malabsorptive disorder such as IBD or celiac disease. She denies prior gastrointestinal surgeries. She affirms adequate intake of oral iron in her diet. The patient affirms fatigue which is chronic. She affirms intermittent lightheadedness, near syncope. She affirms pica symptoms with craving ice. Denies dyspnea on exertion or palpitations. PMH includes hypothyroidism. Denies alcohol or tobacco use. Interval History 1/27/2023:   The patient is here for follow up evaluation. Since her initial visit in December 2022, she received 3 infusions of IV Venofer. Her last infusion was one week ago on 1/18/23. Patient reports following IV iron infusions she has had improvement in fatigue and overall energy levels.  She reports resolution of lightheadedness, dizziness, pica symptoms. She affirms continued heavy menses; LMP was on 1/7/23. She continues to follow up with PCP for hypothyroidism. Recently had dose adjustment with increase in synthroid. Notes improvement in periorbital edema. PMH, SH, and FH:  I reviewed the patients medication list and allergy list as noted on the electronic medical record. The PMH, SH and FH were also reviewed as noted on the EMR. Review of Systems:   Review of Systems   Pertinent review of systems noted in HPI, all other ROS negative. Objective:   Physical Exam   BP (!) 92/56 (Site: Right Upper Arm, Position: Sitting, Cuff Size: Medium Adult)   Pulse 72   Temp 98.1 °F (36.7 °C) (Oral)   Resp 16   Ht 5' 6\" (1.676 m)   Wt 117 lb 9.6 oz (53.3 kg)   LMP 01/07/2023 (Exact Date)   SpO2 96%   BMI 18.98 kg/m²    General appearance: No apparent distress, well developed, and cooperative. HEENT: Pupils equal, round, and reactive to light. Conjunctivae/corneas clear. Oral mucosa moist.   Neck: Supple, with full range of motion. Trachea midline. Respiratory:  Normal respiratory effort. Clear to auscultation bilaterally. No wheezes, rales or rhonchi. Cardiovascular: Regular rate and rhythm with normal S1/S2 without murmurs, rubs or gallops. Abdomen: Soft, active bowel sounds. Musculoskeletal: No clubbing, cyanosis or edema bilaterally. Skin: Skin color, texture, turgor normal.  No visible rashes or lesions. Neurologic:  Neurovascularly intact without any focal sensory/motor deficits.    Psychiatric: Alert and oriented      Imaging Studies and Labs:   CBC:   Lab Results   Component Value Date    WBC 2.5 (L) 01/27/2023    HGB 10.5 (L) 01/27/2023    HCT 34.4 (L) 01/27/2023    MCV 83 01/27/2023     01/27/2023     BMP:   Lab Results   Component Value Date/Time     01/10/2023 10:56 AM    K 4.3 01/10/2023 10:56 AM     01/10/2023 10:56 AM    CO2 27 01/10/2023 10:56 AM BUN 15 01/10/2023 10:56 AM    CREATININE 1.15 01/10/2023 10:56 AM    GLUCOSE 55 01/10/2023 10:56 AM    CALCIUM 9.4 01/10/2023 10:56 AM      LFT:   Lab Results   Component Value Date    ALT 17 11/21/2022    AST 35 11/21/2022    ALKPHOS 41 11/21/2022    BILITOT 0.5 11/21/2022         Assessment and Plan:   1. Microcytic anemia  CBC on 11/21/22 showed Hgb 8.2, Hct 27.3, MCV 72.8. WBC count 2.7 and platelet count 921,258. Microcytosis likely related to iron deficiency anemia from chronic blood loss. Patient has heavy menses. Also uncontrolled hypothyroidism, recently restarted on Synthroid. Prior CBC on 3/2/2019 patient was 1 day postpartum with Hgb 9.4, hct 28.1. She received 3 infusions of IV Venofer, with last infusion on 1/17/23. Today on 1/27/23: Hgb 10.5, Hct 34.4, MCV 83. Platelet count 216,041. MCV has normalized. -Trend Hgb/Hct and iron levels. Will recheck CBC in 6 weeks to allow for full response of IV iron.              -Patient instructed to monitor for signs/symptoms of anemia or blood loss              -Return to clinic in 12 weeks              -CBC, ferritin, iron, TIBC on RTC      2. Iron deficiency anemia due to chronic blood loss  3. Leukopenia  Possibly related to uncontrolled hypothyroidism. WBC count 2.5 on 1/27/23. Recent dose adjustment of Synthroid. Denies b-type symptoms. Monitor for improvement with treatment of hypothyroidism. If persistent will obtain further evaluation. All patient questions answered. Pt voiced understanding. Patient agreed with treatment plan. Follow up as directed. Patient instructed to call for questions or concerns.           Electronically signed by   Jaya Molina PA-C

## 2023-04-28 ENCOUNTER — HOSPITAL ENCOUNTER (OUTPATIENT)
Dept: INFUSION THERAPY | Age: 28
Discharge: HOME | End: 2023-04-28
Payer: COMMERCIAL

## 2023-04-28 ENCOUNTER — OFFICE VISIT (OUTPATIENT)
Dept: ONCOLOGY | Age: 28
End: 2023-04-28
Payer: COMMERCIAL

## 2023-04-28 VITALS
SYSTOLIC BLOOD PRESSURE: 91 MMHG | TEMPERATURE: 97.9 F | HEART RATE: 68 BPM | RESPIRATION RATE: 18 BRPM | DIASTOLIC BLOOD PRESSURE: 61 MMHG

## 2023-04-28 VITALS
SYSTOLIC BLOOD PRESSURE: 91 MMHG | OXYGEN SATURATION: 98 % | WEIGHT: 120 LBS | HEART RATE: 68 BPM | BODY MASS INDEX: 19.29 KG/M2 | TEMPERATURE: 97.9 F | HEIGHT: 66 IN | RESPIRATION RATE: 18 BRPM | DIASTOLIC BLOOD PRESSURE: 61 MMHG

## 2023-04-28 DIAGNOSIS — E06.3 HYPOTHYROIDISM DUE TO HASHIMOTO'S THYROIDITIS: ICD-10-CM

## 2023-04-28 DIAGNOSIS — D72.819 LEUKOPENIA, UNSPECIFIED TYPE: ICD-10-CM

## 2023-04-28 DIAGNOSIS — D50.0 IRON DEFICIENCY ANEMIA DUE TO CHRONIC BLOOD LOSS: ICD-10-CM

## 2023-04-28 DIAGNOSIS — D50.9 MICROCYTIC ANEMIA: Primary | ICD-10-CM

## 2023-04-28 DIAGNOSIS — E03.8 HYPOTHYROIDISM DUE TO HASHIMOTO'S THYROIDITIS: ICD-10-CM

## 2023-04-28 DIAGNOSIS — D50.9 MICROCYTIC ANEMIA: ICD-10-CM

## 2023-04-28 LAB
ABSOLUTE IMMATURE GRANULOCYTE: 0.01 THOU/MM3 (ref 0–0.07)
BASOPHILS ABSOLUTE: 0 THOU/MM3 (ref 0–0.1)
BASOPHILS NFR BLD AUTO: 1 % (ref 0–3)
EOSINOPHIL NFR BLD AUTO: 2 % (ref 0–4)
EOSINOPHILS ABSOLUTE: 0.1 THOU/MM3 (ref 0–0.4)
ERYTHROCYTE [DISTWIDTH] IN BLOOD BY AUTOMATED COUNT: 13.3 % (ref 11.5–14.5)
FERRITIN SERPL IA-MCNC: 12 NG/ML (ref 10–291)
HCT VFR BLD AUTO: 40 % (ref 37–47)
HGB BLD-MCNC: 12.8 GM/DL (ref 12–16)
IMMATURE GRANULOCYTES: 0 %
IRON SERPL-MCNC: 71 UG/DL (ref 50–170)
LYMPHOCYTES ABSOLUTE: 0.9 THOU/MM3 (ref 1–4.8)
LYMPHOCYTES NFR BLD AUTO: 27 % (ref 15–47)
MCH RBC QN AUTO: 28.7 PG (ref 26–33)
MCHC RBC AUTO-ENTMCNC: 32 GM/DL (ref 32.2–35.5)
MCV RBC AUTO: 90 FL (ref 81–99)
MONOCYTES ABSOLUTE: 0.3 THOU/MM3 (ref 0.4–1.3)
MONOCYTES NFR BLD AUTO: 8 % (ref 0–12)
NEUTROPHILS NFR BLD AUTO: 62 % (ref 43–75)
PLATELET # BLD AUTO: 149 THOU/MM3 (ref 130–400)
PMV BLD AUTO: 10.4 FL (ref 9.4–12.4)
RBC # BLD AUTO: 4.46 MILL/MM3 (ref 4.2–5.4)
SEGMENTED NEUTROPHILS ABSOLUTE COUNT: 2 THOU/MM3 (ref 1.8–7.7)
T4 FREE SERPL-MCNC: 1.11 NG/DL (ref 0.93–1.76)
TIBC SERPL-MCNC: 330 UG/DL (ref 171–450)
TSH SERPL DL<=0.005 MIU/L-ACNC: 32.7 UIU/ML (ref 0.4–4.2)
WBC # BLD AUTO: 3.3 THOU/MM3 (ref 4.8–10.8)

## 2023-04-28 PROCEDURE — 83540 ASSAY OF IRON: CPT

## 2023-04-28 PROCEDURE — 84439 ASSAY OF FREE THYROXINE: CPT

## 2023-04-28 PROCEDURE — 84443 ASSAY THYROID STIM HORMONE: CPT

## 2023-04-28 PROCEDURE — 85025 COMPLETE CBC W/AUTO DIFF WBC: CPT

## 2023-04-28 PROCEDURE — 99213 OFFICE O/P EST LOW 20 MIN: CPT | Performed by: PHYSICIAN ASSISTANT

## 2023-04-28 PROCEDURE — 36415 COLL VENOUS BLD VENIPUNCTURE: CPT

## 2023-04-28 PROCEDURE — 99211 OFF/OP EST MAY X REQ PHY/QHP: CPT

## 2023-04-28 PROCEDURE — 82728 ASSAY OF FERRITIN: CPT

## 2023-04-28 PROCEDURE — 83550 IRON BINDING TEST: CPT

## 2023-04-28 RX ORDER — FERROUS SULFATE 325(65) MG
325 TABLET ORAL EVERY OTHER DAY
Qty: 45 TABLET | Refills: 1 | Status: SHIPPED | OUTPATIENT
Start: 2023-04-28

## 2023-05-02 ENCOUNTER — TELEPHONE (OUTPATIENT)
Dept: FAMILY MEDICINE CLINIC | Age: 28
End: 2023-05-02

## 2023-05-02 DIAGNOSIS — E06.3 HYPOTHYROIDISM DUE TO HASHIMOTO'S THYROIDITIS: Primary | ICD-10-CM

## 2023-05-02 DIAGNOSIS — E03.8 HYPOTHYROIDISM DUE TO HASHIMOTO'S THYROIDITIS: Primary | ICD-10-CM

## 2023-05-02 RX ORDER — LEVOTHYROXINE SODIUM 0.1 MG/1
100 TABLET ORAL DAILY
Qty: 90 TABLET | Refills: 0 | Status: SHIPPED | OUTPATIENT
Start: 2023-05-02

## 2023-05-02 NOTE — TELEPHONE ENCOUNTER
Pt called office regarding her TSH results, says she did not see the MyChart msg. Message reviewed with pt and she agrees with the increased dose. She would like this sent to [facility] and if no call back she will check with them after 3pm today. Please advise.

## 2023-05-02 NOTE — TELEPHONE ENCOUNTER
Edit Comments   Add Notifications  Back to Top    Want to keep increasing your thyroid medication up to 100 mcg. If agree let me know and I will send in new prescription.    Written by FIGUEROA Bravo CNP on 4/30/2023  6:20 PM EDT

## 2023-07-31 ENCOUNTER — TELEPHONE (OUTPATIENT)
Dept: ONCOLOGY | Age: 28
End: 2023-07-31

## 2023-09-18 ENCOUNTER — TELEPHONE (OUTPATIENT)
Dept: ONCOLOGY | Age: 28
End: 2023-09-18

## 2023-10-27 ENCOUNTER — OFFICE VISIT (OUTPATIENT)
Dept: ONCOLOGY | Age: 28
End: 2023-10-27

## 2023-10-27 DIAGNOSIS — Z91.199 NO-SHOW FOR APPOINTMENT: Primary | ICD-10-CM

## 2023-10-27 NOTE — PROGRESS NOTES
[facility] [address] [place] [address] Narinder Castaneda [phone] [facility] [address] [address]  Dept: [phone]  Dept Fax: [phone]: [phone]      The patient did not show to scheduled office visit today. Our office staff will attempt to contact the patient and reschedule.      Electronically signed by   Hope Carlin PA-C on 10/27/2023 at 3:06 PM

## 2023-11-03 ENCOUNTER — OFFICE VISIT (OUTPATIENT)
Dept: FAMILY MEDICINE CLINIC | Age: 28
End: 2023-11-03
Payer: COMMERCIAL

## 2023-11-03 VITALS
HEART RATE: 72 BPM | BODY MASS INDEX: 19 KG/M2 | WEIGHT: 117.7 LBS | DIASTOLIC BLOOD PRESSURE: 64 MMHG | RESPIRATION RATE: 12 BRPM | SYSTOLIC BLOOD PRESSURE: 116 MMHG

## 2023-11-03 DIAGNOSIS — E06.3 HYPOTHYROIDISM DUE TO HASHIMOTO'S THYROIDITIS: ICD-10-CM

## 2023-11-03 DIAGNOSIS — M77.8 LEFT SHOULDER TENDONITIS: Primary | ICD-10-CM

## 2023-11-03 DIAGNOSIS — E03.8 HYPOTHYROIDISM DUE TO HASHIMOTO'S THYROIDITIS: ICD-10-CM

## 2023-11-03 PROCEDURE — 99213 OFFICE O/P EST LOW 20 MIN: CPT | Performed by: NURSE PRACTITIONER

## 2023-11-03 RX ORDER — METHYLPREDNISOLONE 4 MG/1
TABLET ORAL
Qty: 1 KIT | Refills: 0 | Status: SHIPPED | OUTPATIENT
Start: 2023-11-03 | End: 2023-11-09

## 2023-11-03 SDOH — ECONOMIC STABILITY: FOOD INSECURITY: WITHIN THE PAST 12 MONTHS, THE FOOD YOU BOUGHT JUST DIDN'T LAST AND YOU DIDN'T HAVE MONEY TO GET MORE.: NEVER TRUE

## 2023-11-03 SDOH — ECONOMIC STABILITY: FOOD INSECURITY: WITHIN THE PAST 12 MONTHS, YOU WORRIED THAT YOUR FOOD WOULD RUN OUT BEFORE YOU GOT MONEY TO BUY MORE.: NEVER TRUE

## 2023-11-03 SDOH — ECONOMIC STABILITY: INCOME INSECURITY: HOW HARD IS IT FOR YOU TO PAY FOR THE VERY BASICS LIKE FOOD, HOUSING, MEDICAL CARE, AND HEATING?: NOT HARD AT ALL

## 2023-11-03 SDOH — ECONOMIC STABILITY: HOUSING INSECURITY
IN THE LAST 12 MONTHS, WAS THERE A TIME WHEN YOU DID NOT HAVE A STEADY PLACE TO SLEEP OR SLEPT IN A SHELTER (INCLUDING NOW)?: NO

## 2023-11-03 ASSESSMENT — ENCOUNTER SYMPTOMS: RESPIRATORY NEGATIVE: 1

## 2023-11-03 ASSESSMENT — PATIENT HEALTH QUESTIONNAIRE - PHQ9
2. FEELING DOWN, DEPRESSED OR HOPELESS: 0
1. LITTLE INTEREST OR PLEASURE IN DOING THINGS: 0
SUM OF ALL RESPONSES TO PHQ QUESTIONS 1-9: 0
SUM OF ALL RESPONSES TO PHQ QUESTIONS 1-9: 0
SUM OF ALL RESPONSES TO PHQ9 QUESTIONS 1 & 2: 0
SUM OF ALL RESPONSES TO PHQ QUESTIONS 1-9: 0
SUM OF ALL RESPONSES TO PHQ QUESTIONS 1-9: 0

## 2023-11-03 NOTE — PROGRESS NOTES
Edgar Menezes (1995) 33 y.o. female here for evaluation of the following chief complaint(s):      HPI:  Chief Complaint   Patient presents with    Shoulder Pain     Left, seeing chiropractor. Recommending steroids for inflammation       Seen Chiropractor this week. Dx with tendonitis. Ongoing for 1-2 months. Recommended MDP. Works out and lifts a lot. Affecting her on day to day tasks as well. Vitals:    11/03/23 1051   BP: 116/64   Pulse: 72   Resp: 12       On Synthroid 100 mcg. Improved energy since last dose adjustment. Due for repeat    Lab Results   Component Value Date    TSH 32.700 (H) 04/28/2023         Patient Active Problem List   Diagnosis    Vacuum extraction, delivered, current hospitalization    Microcytic anemia    Iron deficiency anemia due to chronic blood loss       SUBJECTIVE/OBJECTIVE:  Review of Systems   Constitutional: Negative. Respiratory: Negative. Cardiovascular: Negative. Musculoskeletal:  Positive for arthralgias. Physical Exam  Constitutional:       General: She is not in acute distress. Appearance: She is not ill-appearing. Cardiovascular:      Rate and Rhythm: Normal rate and regular rhythm. Pulses: Normal pulses. Heart sounds: Normal heart sounds. Musculoskeletal:      Left shoulder: Tenderness present. No bony tenderness. Normal range of motion. Arms:    Neurological:      Mental Status: She is alert. ASSESSMENT/PLAN:   Diagnosis Orders   1. Left shoulder tendonitis  methylPREDNISolone (MEDROL, LIZANDRO,) 4 MG tablet      2. Hypothyroidism due to Hashimoto's thyroiditis  TSH    T4, Free            MDM:  MDP  Low weight lifts, stretches  Repeat Thyroid testing  RTO PRn    An electronic signature was used to authenticate this note.     --Sanford Espinoza, FIGUEROA - CNP

## 2023-11-06 LAB
T4 FREE: 1.48 NG/DL (ref 0.8–1.9)
TSH SERPL DL<=0.05 MIU/L-ACNC: 8.13 UIU/ML (ref 0.4–4.1)

## 2023-11-27 RX ORDER — FERROUS SULFATE 325(65) MG
325 TABLET ORAL EVERY OTHER DAY
Qty: 15 TABLET | Refills: 0 | Status: SHIPPED | OUTPATIENT
Start: 2023-11-27 | End: 2023-12-14 | Stop reason: SDUPTHER

## 2023-11-27 NOTE — TELEPHONE ENCOUNTER
30 day supply with no refills given. Patient has missed multiple appointments in office. Scheduled for visit on 12/11/23.

## 2023-12-11 ENCOUNTER — TELEPHONE (OUTPATIENT)
Dept: ONCOLOGY | Age: 28
End: 2023-12-11

## 2023-12-14 ENCOUNTER — TELEPHONE (OUTPATIENT)
Dept: ONCOLOGY | Age: 28
End: 2023-12-14

## 2023-12-14 DIAGNOSIS — D50.0 IRON DEFICIENCY ANEMIA DUE TO CHRONIC BLOOD LOSS: Primary | ICD-10-CM

## 2023-12-14 RX ORDER — FERROUS SULFATE 325(65) MG
325 TABLET ORAL EVERY OTHER DAY
Qty: 15 TABLET | Refills: 5 | Status: SHIPPED | OUTPATIENT
Start: 2023-12-14

## 2023-12-14 NOTE — TELEPHONE ENCOUNTER
Incoming fax received for 90 day supply of ferrous sulfate 325 mg tablet. Patient has no showed last three appointments, and per [facility] PA, this should now be managed by PCP since the patient has not been seen here since April.

## 2023-12-14 NOTE — TELEPHONE ENCOUNTER
Recommend refill per PCP. Patient has not been seen in clinic since April 2023. Needs updated labs to determine if refill appropriate.

## 2024-01-15 DIAGNOSIS — D50.0 IRON DEFICIENCY ANEMIA DUE TO CHRONIC BLOOD LOSS: Primary | ICD-10-CM

## 2024-01-17 ENCOUNTER — TELEPHONE (OUTPATIENT)
Dept: ONCOLOGY | Age: 29
End: 2024-01-17

## 2024-02-16 NOTE — PROGRESS NOTES
with full range of motion. Trachea midline.   Respiratory:  Normal respiratory effort. Clear to auscultation all lung fields.  Cardiovascular: RRR, S1/S2  Abdomen: Soft, non-tender, non-distended with active BS  Musculoskeletal: No clubbing, cyanosis or edema bilaterally.  She is able to ambulate in office  Skin: Skin color, texture, turgor normal.  No visible rashes or lesions.  Neurologic:  Neurovascularly intact without any focal sensory/motor deficits. Cranial nerves: II-XII intact, grossly non-focal.  Psychiatric: Alert and oriented x 3, thought content appropriate, normal insight  Capillary Refill: < 3 seconds   Peripheral Pulses: +2 palpable      Imaging Studies and Labs:   CBC:   Lab Results   Component Value Date    WBC 3.6 (L) 02/19/2024    HGB 11.5 (L) 02/19/2024    HCT 37.3 02/19/2024    MCV 81 02/19/2024     02/19/2024     BMP:   Lab Results   Component Value Date/Time     01/10/2023 10:56 AM    K 4.3 01/10/2023 10:56 AM     01/10/2023 10:56 AM    CO2 27 01/10/2023 10:56 AM    BUN 15 01/10/2023 10:56 AM    CREATININE 1.15 01/10/2023 10:56 AM    GLUCOSE 55 01/10/2023 10:56 AM    CALCIUM 9.4 01/10/2023 10:56 AM      LFT:   Lab Results   Component Value Date    ALT 17 11/21/2022    AST 35 11/21/2022    ALKPHOS 41 11/21/2022    BILITOT 0.5 11/21/2022         Assessment and Plan:     1. Iron deficiency anemia due to chronic blood loss  Hx AUGUSTINE related to heavy menses, uncontrolled hypothyroidism.  She has had IV iron infusions in the past.  She has been lost to follow up since April 2023-she states she was very busy.  H/H today 11.5/37.3.  MCV 81.  She states her thyroid is better regulated and she feels so much better.  She states heavy menses has improved.  Await iron studies.     2. Leukopenia, unspecified type  Ongoing since 11/2022.  Likely related to uncontrolled hypothyroidism.  Denies B-type symptoms.  Has slowly been improving with better regulation of thyroid.  WBC improved today

## 2024-02-19 ENCOUNTER — OFFICE VISIT (OUTPATIENT)
Dept: ONCOLOGY | Age: 29
End: 2024-02-19
Payer: COMMERCIAL

## 2024-02-19 ENCOUNTER — HOSPITAL ENCOUNTER (OUTPATIENT)
Dept: INFUSION THERAPY | Age: 29
Discharge: HOME | End: 2024-02-19
Payer: COMMERCIAL

## 2024-02-19 VITALS
WEIGHT: 115.2 LBS | DIASTOLIC BLOOD PRESSURE: 65 MMHG | BODY MASS INDEX: 18.51 KG/M2 | OXYGEN SATURATION: 100 % | SYSTOLIC BLOOD PRESSURE: 107 MMHG | TEMPERATURE: 98 F | RESPIRATION RATE: 18 BRPM | HEIGHT: 66 IN | HEART RATE: 87 BPM

## 2024-02-19 VITALS
OXYGEN SATURATION: 100 % | SYSTOLIC BLOOD PRESSURE: 107 MMHG | RESPIRATION RATE: 18 BRPM | TEMPERATURE: 98 F | HEART RATE: 87 BPM | DIASTOLIC BLOOD PRESSURE: 65 MMHG

## 2024-02-19 DIAGNOSIS — D72.819 LEUKOPENIA, UNSPECIFIED TYPE: ICD-10-CM

## 2024-02-19 DIAGNOSIS — D50.0 IRON DEFICIENCY ANEMIA DUE TO CHRONIC BLOOD LOSS: ICD-10-CM

## 2024-02-19 DIAGNOSIS — D50.0 IRON DEFICIENCY ANEMIA DUE TO CHRONIC BLOOD LOSS: Primary | ICD-10-CM

## 2024-02-19 LAB
ABSOLUTE IMMATURE GRANULOCYTE: 0.01 THOU/MM3 (ref 0–0.07)
BASOPHILS ABSOLUTE: 0 THOU/MM3 (ref 0–0.1)
BASOPHILS NFR BLD AUTO: 1 % (ref 0–3)
EOSINOPHIL NFR BLD AUTO: 2 % (ref 0–4)
EOSINOPHILS ABSOLUTE: 0.1 THOU/MM3 (ref 0–0.4)
ERYTHROCYTE [DISTWIDTH] IN BLOOD BY AUTOMATED COUNT: 13.8 % (ref 11.5–14.5)
FERRITIN SERPL IA-MCNC: 7 NG/ML (ref 10–291)
HCT VFR BLD AUTO: 37.3 % (ref 37–47)
HGB BLD-MCNC: 11.5 GM/DL (ref 12–16)
IMMATURE GRANULOCYTES: 0 %
IRON SATN MFR SERPL: 9 % (ref 20–50)
IRON SERPL-MCNC: 29 UG/DL (ref 50–170)
LYMPHOCYTES ABSOLUTE: 0.7 THOU/MM3 (ref 1–4.8)
LYMPHOCYTES NFR BLD AUTO: 19 % (ref 15–47)
MCH RBC QN AUTO: 25.1 PG (ref 26–33)
MCHC RBC AUTO-ENTMCNC: 30.8 GM/DL (ref 32.2–35.5)
MCV RBC AUTO: 81 FL (ref 81–99)
MONOCYTES ABSOLUTE: 0.2 THOU/MM3 (ref 0.4–1.3)
MONOCYTES NFR BLD AUTO: 6 % (ref 0–12)
NEUTROPHILS NFR BLD AUTO: 73 % (ref 43–75)
PLATELET # BLD AUTO: 159 THOU/MM3 (ref 130–400)
PMV BLD AUTO: 11.1 FL (ref 9.4–12.4)
RBC # BLD AUTO: 4.59 MILL/MM3 (ref 4.2–5.4)
SEGMENTED NEUTROPHILS ABSOLUTE COUNT: 2.6 THOU/MM3 (ref 1.8–7.7)
TIBC SERPL-MCNC: 317 UG/DL (ref 171–450)
WBC # BLD AUTO: 3.6 THOU/MM3 (ref 4.8–10.8)

## 2024-02-19 PROCEDURE — 83540 ASSAY OF IRON: CPT

## 2024-02-19 PROCEDURE — 85025 COMPLETE CBC W/AUTO DIFF WBC: CPT

## 2024-02-19 PROCEDURE — 36415 COLL VENOUS BLD VENIPUNCTURE: CPT

## 2024-02-19 PROCEDURE — 99214 OFFICE O/P EST MOD 30 MIN: CPT | Performed by: NURSE PRACTITIONER

## 2024-02-19 PROCEDURE — 99211 OFF/OP EST MAY X REQ PHY/QHP: CPT

## 2024-02-19 PROCEDURE — 82728 ASSAY OF FERRITIN: CPT

## 2024-02-19 PROCEDURE — 83550 IRON BINDING TEST: CPT

## 2024-03-16 DIAGNOSIS — D50.0 IRON DEFICIENCY ANEMIA DUE TO CHRONIC BLOOD LOSS: Primary | ICD-10-CM

## 2024-04-18 DIAGNOSIS — E03.8 HYPOTHYROIDISM DUE TO HASHIMOTO'S THYROIDITIS: ICD-10-CM

## 2024-04-18 DIAGNOSIS — E06.3 HYPOTHYROIDISM DUE TO HASHIMOTO'S THYROIDITIS: ICD-10-CM

## 2024-04-18 RX ORDER — LEVOTHYROXINE SODIUM 0.1 MG/1
100 TABLET ORAL DAILY
Qty: 90 TABLET | Refills: 1 | Status: SHIPPED | OUTPATIENT
Start: 2024-04-18

## 2024-11-02 DIAGNOSIS — Z00.00 WELL ADULT EXAM: Primary | ICD-10-CM

## 2024-11-02 DIAGNOSIS — E06.3 HYPOTHYROIDISM DUE TO HASHIMOTO'S THYROIDITIS: ICD-10-CM

## 2024-11-04 RX ORDER — LEVOTHYROXINE SODIUM 100 UG/1
100 TABLET ORAL DAILY
Qty: 30 TABLET | Refills: 5 | OUTPATIENT
Start: 2024-11-04

## 2024-11-12 ENCOUNTER — TELEPHONE (OUTPATIENT)
Dept: FAMILY MEDICINE CLINIC | Age: 29
End: 2024-11-12

## 2024-11-12 DIAGNOSIS — E06.3 HYPOTHYROIDISM DUE TO HASHIMOTO'S THYROIDITIS: ICD-10-CM

## 2024-11-12 NOTE — TELEPHONE ENCOUNTER
Called patient and left a message to call the office.   Patient is needing to have labs completed prior to appointment.

## 2024-11-12 NOTE — TELEPHONE ENCOUNTER
----- Message from Luís MCALLISTER sent at 11/12/2024 11:49 AM EST -----  Regarding: [facility] Appointment Request  [facility] Appointment Request    Patient needs appointment for [facility] Appointment Type: Annual Visit.    Patient Requested Dates(s):  Next week  Patient Requested Time: around 3:30 PM  Provider Name: Wilfrid Mata     Reason for Appointment Request: Established Patient - No appointments available during search. PT would like to reschedule the appt the she missed yesterday  --------------------------------------------------------------------------------------------------------------------------    Relationship to Patient: Self     Call Back Information: OK to leave message on voicemail  Preferred Call Back Number: Phone [phone]

## 2024-11-13 LAB
BASOPHILS ABSOLUTE: 0.02 K/UL (ref 0–0.2)
BASOPHILS RELATIVE PERCENT: 0.4 % (ref 0–2)
EOSINOPHILS ABSOLUTE: 0.15 K/UL (ref 0–0.8)
EOSINOPHILS RELATIVE PERCENT: 2.9 % (ref 0–5)
HCT VFR BLD CALC: 39.2 % (ref 35–47)
HEMOGLOBIN: 12.2 G/DL (ref 11.9–16)
IMMATURE GRANS (ABS): 0.01 K/UL (ref 0–0.06)
IMMATURE GRANULOCYTES %: 0.2 % (ref 0–2)
LYMPHOCYTES ABSOLUTE: 1.02 K/UL (ref 0.9–5.2)
LYMPHOCYTES RELATIVE PERCENT: 19.4 % (ref 20–45)
MCH RBC QN AUTO: 24.7 PG (ref 26–33)
MCHC RBC AUTO-ENTMCNC: 31.1 G/DL (ref 32–35)
MCV RBC AUTO: 80 FL (ref 75–100)
MONOCYTES ABSOLUTE: 0.34 K/UL (ref 0.1–1)
MONOCYTES RELATIVE PERCENT: 6.5 % (ref 0–13)
NEUTROPHILS ABSOLUTE: 3.72 K/UL (ref 1.9–8)
NEUTROPHILS RELATIVE PERCENT: 70.6 % (ref 45–75)
PDW BLD-RTO: 13.2 % (ref 11.2–14.8)
PLATELET # BLD: 209 THOUS/CMM (ref 140–440)
RBC # BLD: 4.93 MILL/CMM (ref 3.8–5.2)
T4 FREE: 1.64 NG/DL (ref 0.8–1.9)
TSH SERPL DL<=0.05 MIU/L-ACNC: 0.24 UIU/ML (ref 0.27–4.2)
WBC # BLD: 5.3 THDS/CMM (ref 3.6–11)

## 2024-11-13 RX ORDER — LEVOTHYROXINE SODIUM 100 UG/1
100 TABLET ORAL DAILY
Qty: 30 TABLET | Refills: 0 | Status: SHIPPED | OUTPATIENT
Start: 2024-11-13

## 2024-11-13 NOTE — TELEPHONE ENCOUNTER
Patient returned our call and appointment scheduled for Monday.   She was unable to make it sooner.   She stated that she did get the lab work completed. She wanted to know if you could send in a refill to get her to the appointment.   She is using [facility].     She will check with [facility] after 2:00 today

## 2024-12-15 DIAGNOSIS — E06.3 HYPOTHYROIDISM DUE TO HASHIMOTO'S THYROIDITIS: ICD-10-CM

## 2024-12-16 RX ORDER — LEVOTHYROXINE SODIUM 100 UG/1
100 TABLET ORAL DAILY
Qty: 30 TABLET | Refills: 0 | OUTPATIENT
Start: 2024-12-16

## 2024-12-17 ENCOUNTER — OFFICE VISIT (OUTPATIENT)
Dept: FAMILY MEDICINE CLINIC | Age: 29
End: 2024-12-17
Payer: COMMERCIAL

## 2024-12-17 VITALS
HEART RATE: 64 BPM | BODY MASS INDEX: 19.38 KG/M2 | WEIGHT: 120.6 LBS | SYSTOLIC BLOOD PRESSURE: 118 MMHG | HEIGHT: 66 IN | RESPIRATION RATE: 16 BRPM | DIASTOLIC BLOOD PRESSURE: 82 MMHG

## 2024-12-17 DIAGNOSIS — E06.3 HYPOTHYROIDISM DUE TO HASHIMOTO'S THYROIDITIS: ICD-10-CM

## 2024-12-17 DIAGNOSIS — Z00.00 WELL ADULT EXAM: Primary | ICD-10-CM

## 2024-12-17 PROCEDURE — 99395 PREV VISIT EST AGE 18-39: CPT | Performed by: NURSE PRACTITIONER

## 2024-12-17 RX ORDER — LEVOTHYROXINE SODIUM 100 UG/1
100 TABLET ORAL DAILY
Qty: 90 TABLET | Refills: 3 | Status: SHIPPED | OUTPATIENT
Start: 2024-12-17

## 2024-12-17 SDOH — ECONOMIC STABILITY: FOOD INSECURITY: WITHIN THE PAST 12 MONTHS, YOU WORRIED THAT YOUR FOOD WOULD RUN OUT BEFORE YOU GOT MONEY TO BUY MORE.: NEVER TRUE

## 2024-12-17 SDOH — ECONOMIC STABILITY: FOOD INSECURITY: WITHIN THE PAST 12 MONTHS, THE FOOD YOU BOUGHT JUST DIDN'T LAST AND YOU DIDN'T HAVE MONEY TO GET MORE.: NEVER TRUE

## 2024-12-17 SDOH — ECONOMIC STABILITY: INCOME INSECURITY: HOW HARD IS IT FOR YOU TO PAY FOR THE VERY BASICS LIKE FOOD, HOUSING, MEDICAL CARE, AND HEATING?: NOT HARD AT ALL

## 2024-12-17 ASSESSMENT — ENCOUNTER SYMPTOMS
ABDOMINAL PAIN: 0
COUGH: 0
NAUSEA: 0
SHORTNESS OF BREATH: 0

## 2024-12-17 ASSESSMENT — PATIENT HEALTH QUESTIONNAIRE - PHQ9
SUM OF ALL RESPONSES TO PHQ9 QUESTIONS 1 & 2: 0
2. FEELING DOWN, DEPRESSED OR HOPELESS: NOT AT ALL
SUM OF ALL RESPONSES TO PHQ QUESTIONS 1-9: 0
SUM OF ALL RESPONSES TO PHQ QUESTIONS 1-9: 0
1. LITTLE INTEREST OR PLEASURE IN DOING THINGS: NOT AT ALL
SUM OF ALL RESPONSES TO PHQ QUESTIONS 1-9: 0
SUM OF ALL RESPONSES TO PHQ QUESTIONS 1-9: 0

## 2024-12-17 NOTE — PROGRESS NOTES
Subjective:      Patient ID: Gianna Garcia 1995 is a 29 y.o. female here for evaluation.     HPI: 2 month Follow up    Chief Complaint   Patient presents with    Annual Exam       Hashimoto.  On Synthroid 100 mcg.  T4 1.64.  denies neck swelling, fullness or swallowing troubles.     Lab Results   Component Value Date    TSH 0.242 (L) 11/12/2024       Periods are lighter.   Regular.   Previous followed with [facility] for iron transfusions.  No longer on iron supplement.  CBC as below.     Lab Results   Component Value Date    WBC 5.3 11/12/2024    HGB 12.2 11/12/2024    HCT 39.2 11/12/2024     11/12/2024       Vitals:    12/17/24 1517   BP: 118/82   Pulse: 64   Resp: 16        Denies CP, SOB or chest tightness.  Active.  Regular exercise.     BP Readings from Last 3 Encounters:   12/17/24 118/82   02/19/24 107/65   02/19/24 107/65       Due for screening labs.    Lab Results   Component Value Date    LABA1C 5.1 11/21/2022     Lab Results   Component Value Date     11/21/2022       No components found for: \"CHLPL\"  Lab Results   Component Value Date    TRIG 65 11/21/2022     Lab Results   Component Value Date    HDL 81 11/21/2022     No components found for: \"LDLCALC\"    No components found for: \"LABVLDL\"        Chemistry        Component Value Date/Time     01/10/2023 1056    K 4.3 01/10/2023 1056     01/10/2023 1056    CO2 27 01/10/2023 1056    BUN 15 01/10/2023 1056    CREATININE 1.15 01/10/2023 1056        Component Value Date/Time    CALCIUM 9.4 01/10/2023 1056    ALKPHOS 41 11/21/2022 0936    AST 35 11/21/2022 0936    ALT 17 11/21/2022 0936    BILITOT 0.5 11/21/2022 0936            Lab Results   Component Value Date    TSH 0.242 (L) 11/12/2024       Lab Results   Component Value Date    WBC 5.3 11/12/2024    HGB 12.2 11/12/2024    HCT 39.2 11/12/2024    MCV 80 11/12/2024     11/12/2024         Health Maintenance   Topic Date Due    Varicella vaccine (1 of 2 - 13+ 2-dose